# Patient Record
Sex: FEMALE | Race: WHITE | NOT HISPANIC OR LATINO | Employment: FULL TIME | URBAN - METROPOLITAN AREA
[De-identification: names, ages, dates, MRNs, and addresses within clinical notes are randomized per-mention and may not be internally consistent; named-entity substitution may affect disease eponyms.]

---

## 2023-01-16 LAB
ANTICARDIOLIPIN IGA ANTIBODY: <0.5 APL U/ML (ref 0–20)
ANTICARDIOLIPIN IGG ANTIBODY: <1.6 GPL U/ML (ref 0–20)
ANTICARDIOLIPIN IGM ANTIBODY: 0.3 MPL U/ML (ref 0–20)
FOLATE: 16.1 NG/ML
HOMOCYSTEINE: 12.18 UMOL/L (ref 5–13)
TSH SERPL DL<=0.05 MIU/L-ACNC: 3.41 MIU/L (ref 0.44–3.9)
VITAMIN B-12: 169 PG/ML (ref 211–911)
VITAMIN D 25-HYDROXY: 21 NG/ML

## 2023-01-17 LAB
ANA PATTERN: NORMAL
ANA TITER: NORMAL
ANA WITH REFLEX TO ENA: POSITIVE
ANTI RNP AB: 0.2 AI
ANTI SM/ANTIRNP: <0.2 AI
ANTI SM: <0.2 AI
ANTI SSA: <0.2 AI
ANTI SSB: <0.2 AI
ANTI-CENTROMERE: <0.2 AI
ANTI-CHROMATIN ANTIBODY: <0.2 AI
ANTI-JO1 IGG: <0.2 AI
ANTI-SCL70 IGG: <0.2 AI
DOUBLE STRANDED DNA AB, IGG: 1 IU/ML
RIBOSOMAL P AB: <0.2 AI

## 2023-09-30 ENCOUNTER — PHARMACY VISIT (OUTPATIENT)
Dept: PHARMACY | Facility: CLINIC | Age: 58
End: 2023-09-30
Payer: COMMERCIAL

## 2023-10-02 RX ORDER — DIAZEPAM 10 MG/1
TABLET ORAL
Qty: 4 TABLET | Refills: 0 | OUTPATIENT
Start: 2023-10-02 | End: 2024-04-03

## 2023-10-06 ENCOUNTER — PHARMACY VISIT (OUTPATIENT)
Dept: PHARMACY | Facility: CLINIC | Age: 58
End: 2023-10-06
Payer: COMMERCIAL

## 2023-10-06 PROCEDURE — RXMED WILLOW AMBULATORY MEDICATION CHARGE

## 2023-10-12 ENCOUNTER — PHARMACY VISIT (OUTPATIENT)
Dept: PHARMACY | Facility: CLINIC | Age: 58
End: 2023-10-12
Payer: COMMERCIAL

## 2023-10-12 PROCEDURE — RXMED WILLOW AMBULATORY MEDICATION CHARGE

## 2023-10-12 RX ORDER — DIAZEPAM 10 MG/1
10 TABLET ORAL
Qty: 4 TABLET | Refills: 0 | OUTPATIENT
Start: 2023-10-12 | End: 2023-12-28 | Stop reason: WASHOUT

## 2023-10-16 ENCOUNTER — PHARMACY VISIT (OUTPATIENT)
Dept: PHARMACY | Facility: CLINIC | Age: 58
End: 2023-10-16
Payer: COMMERCIAL

## 2023-10-16 PROCEDURE — RXMED WILLOW AMBULATORY MEDICATION CHARGE

## 2023-10-16 RX ORDER — METHYLPREDNISOLONE 4 MG/1
TABLET ORAL
Qty: 21 TABLET | Refills: 0 | OUTPATIENT
Start: 2023-10-16 | End: 2023-12-28 | Stop reason: WASHOUT

## 2023-10-16 RX ORDER — AMOXICILLIN 875 MG/1
875 TABLET, FILM COATED ORAL 2 TIMES DAILY
Qty: 14 TABLET | Refills: 0 | OUTPATIENT
Start: 2023-10-16 | End: 2023-12-28 | Stop reason: WASHOUT

## 2023-10-25 ENCOUNTER — APPOINTMENT (OUTPATIENT)
Dept: CARDIOLOGY | Facility: CLINIC | Age: 58
End: 2023-10-25
Payer: COMMERCIAL

## 2023-12-12 NOTE — PROGRESS NOTES
PCP: Dr. Jones  Cardio: Dr. Zuniga    I was asked by Dr. Zuniga to evaluate this patient in consultation for evaluation of left atrial appendage closure.    The patient is a 58-year-old female employee of [employer] with history of both TIA and CVA as well as migraine headaches.  She has permanent atrial fibrillation and has undergone prior catheter ablation.. The patient has normal LVEF.  Transthoracic echo performed January 17, 2023 demonstrated normal LV systolic function with LVEF 60 to 65%.  The patient has no history of significant valvular heart disease.  The patient is status post permanent pacemaker placement.    Plan functional baseline, patient is quite active and works full-time.  Unfortunately she has had a number of events which have been attributed to TIA events in which she developed right-sided weakness, clumsiness and gait instability.  This has resulted in her falling.  On other occasions she has been able to ease herself down to the ground by sliding down the wall for instance or grabbing onto a handrail.  She has had a presyncopal event prior to her pacemaker placement.    She is currently on aspirin and Plavix.  She is not on oral anticoagulant therapy.    Given the patient's significant fall risk,  the patient is referred for consideration of left atrial appendage closure for stroke risk reduction.       ROS:  Constitutional: not feeling tired, not feeling poorly, no fever and no chills.   Eyes: no eyesight problems, no blurred vision, no diplopia, no eye pain, no purulent discharge from the eyes, eyes not red, no dryness of the eyes and no itching of the eyes.   ENT: no nosebleeds, no hearing loss, no tinnitus, no earache, no sore throat, no hoarseness, no swollen glands in the neck and no nasal discharge.   Cardiovascular: no intermittent leg claudication, no chest pain, no tightness or heavy pressure, no shortness of breath, no palpitations, no lower extremity edema, the heart rate  was not slow, the heart rate was not fast and as noted in HPI.   Respiratory: no chronic cough, not coughing up sputum,  no wheezing that is consistent with asthma, no asthma, no orthopnea and no postural nocturnal dyspnea.   Gastrointestinal: no change in bowel habits, no blood in stools, no diarrhea, no constipation, no nausea, no vomiting, no abdominal pain, no signs and symptoms of ulcer disease, no gilbert colored stools and no intolerance to fatty foods.   Genitourinary: no hematuria,  no urinary frequency, no dysuria, no incontinence, no burning sensation during urination, no urinary hesitancy, no nocturia, no genital lesion, no testicular pain, urinary stream is not smaller and urinary stream does not start and stop.   Musculoskeletal: no arthralgias, no myalgias, no joint swelling, no joint stiffness, no muscle weakness, no back pain, no limb pain, no limb swelling and no difficulty walking.   Skin: no skin rashes, no change in skin color and pigmentation, no skin lesions and no skin lumps.   Neurological: no seizures, notable for frequent falls, no headaches, no dizziness, no tingling, also has history of fainting and no limb weakness.   Psychiatric: no depression, not suicidal, no confusion, no memory lapses or loss, no anxiety, no personality change and no emotional problems.   Endocrine: no goiter, no thyroid disorder, no diabetes mellitus, no excessive thirst, no dry skin, no cold intolerance, no heat intolerance, no erectile dysfunction, no increased urinary frequency, no proptosis and no deepening of the voice.   Hematologic/Lymphatic: no bleeding issues.   All other systems have been reviewed and are negative for complaint.     Physical Exam:     There were no vitals taken for this visit.     Constitutional: alert and in no acute distress.   Eyes: no erythema, swelling or discharge from the eye .   Ears, Nose, Mouth, and Throat: external inspection of ears and nose is normal , lips, teeth, and gums are  normal with good dentition  and oropharynx normal with no erythema, edema, exudate or lesions .   Neck: neck is supple, symmetric, trachea midline, no masses  and no thyromegaly .   Pulmonary: no increased work of breathing or signs of respiratory distress , lungs clear to auscultation. , normal percussion of chest  and chest palpation normal .   Cardiovascular: RRR, no murmur,  no leg edema  Abdomen: abdomen non-tender, no masses  and no hepatomegaly .           Skin:  no skin lesions          Neurologic: non-focal neurologic examination.      Psychiatric judgment and insight is normal , oriented to person, place and time , normal mood and affect .       Labs:    Results for orders placed or performed in visit on 09/14/22   Thyroid Stimulating Hormone   Result Value Ref Range    TSH 2.05 0.44 - 3.98 mIU/L   Vitamin D, Total   Result Value Ref Range    Vitamin D, 25-Hydroxy 11 (A) ng/mL   Lipid Panel   Result Value Ref Range    Cholesterol 193 0 - 199 mg/dL    HDL 56.0 mg/dL    Cholesterol/HDL Ratio 3.4      (H) 0 - 99 mg/dL    VLDL 21 0 - 40 mg/dL    Triglycerides 103 0 - 149 mg/dL   CBC   Result Value Ref Range    WBC 5.5 4.4 - 11.3 x10E9/L    RBC 4.69 4.00 - 5.20 x10E12/L    Hemoglobin 13.8 12.0 - 16.0 g/dL    Hematocrit 44.2 36.0 - 46.0 %    MCV 94 80 - 100 fL    MCHC 31.2 (L) 32.0 - 36.0 g/dL    Platelets 247 150 - 450 x10E9/L    RDW 12.4 11.5 - 14.5 %   Comprehensive Metabolic Panel   Result Value Ref Range    Glucose 86 74 - 99 mg/dL    Sodium 141 136 - 145 mmol/L    Potassium 4.4 3.5 - 5.3 mmol/L    Chloride 107 98 - 107 mmol/L    Bicarbonate 28 21 - 32 mmol/L    Anion Gap 10 10 - 20 mmol/L    Urea Nitrogen 14 6 - 23 mg/dL    Creatinine 0.88 0.50 - 1.05 mg/dL    GFR Female 75 >90 mL/min/1.73m2    Calcium 9.2 8.6 - 10.3 mg/dL    Albumin 4.1 3.4 - 5.0 g/dL    Alkaline Phosphatase 80 33 - 110 U/L    Total Protein 6.6 6.4 - 8.2 g/dL    AST 17 9 - 39 U/L    Total Bilirubin 0.6 0.0 - 1.2 mg/dL    ALT (SGPT)  16 7 - 45 U/L          Medications:    Current Outpatient Medications   Medication Instructions    amoxicillin (AMOXIL) 875 mg, oral, 2 times daily    diazePAM (Valium) 10 mg tablet TAKE 1 TABLET BY MOUTH 1/2 HOUR PRIOR TO APPOINTMENT.    diazePAM (Valium) 10 mg tablet Take 1 tablet (10 mg) by mouth 30 minutes prior to appointment.    methylPREDNISolone (Medrol Dospak) 4 mg tablets Take by mouth as directed on package    metoprolol tartrate (Lopressor) 25 mg tablet TAKE 1 TABLET BY MOUTH TWO TIMES A DAY    oxybutynin XL (Ditropan-XL) 15 mg 24 hr tablet TAKE 2 TABLETS BY MOUTH ONCE DAILY    simvastatin (Zocor) 20 mg tablet TAKE 1 TABLET BY MOUTH EVERY NIGHT AT BEDTIME          Assessment:      This is a 4-year-old female with permanent atrial fibrillation and significant risk of falls.    The CHADS-VASC score is 3 and HAS-BLED score is 2. The patient is at increased risk of both bleeding and stroke.  As such the patient is a reasonable candidate for consideration of left atrial appendage occluder placement.    Today we discussed the left atrial appendage closure procedure. The patient was given written educational handout materials and watched an educational video. All risks, benefits and alternative were discussed.     The risks discussed included but were not limited to vascular complications, sedation related complications, risk of MI, CVA, device embolization, pericardial tamponade and death. The patient verbalized understanding and decided to proceed.        Plan will be for preprocedural cardiac CT. Following device implant, strategy will be for dual antiplatelet therapy with aspirin and clopidogrel for 6 months then aspirin for life.     Thank you, Dr. Zuniga, for this consultation and for allowing me to participate in the care of this patient.

## 2023-12-13 ENCOUNTER — OFFICE VISIT (OUTPATIENT)
Dept: CARDIOLOGY | Facility: CLINIC | Age: 58
End: 2023-12-13
Payer: COMMERCIAL

## 2023-12-13 VITALS
TEMPERATURE: 96.9 F | OXYGEN SATURATION: 98 % | SYSTOLIC BLOOD PRESSURE: 136 MMHG | BODY MASS INDEX: 30.06 KG/M2 | RESPIRATION RATE: 14 BRPM | HEART RATE: 80 BPM | DIASTOLIC BLOOD PRESSURE: 72 MMHG | HEIGHT: 70 IN | WEIGHT: 210 LBS

## 2023-12-13 DIAGNOSIS — I48.91 ATRIAL FIBRILLATION, UNSPECIFIED TYPE (MULTI): ICD-10-CM

## 2023-12-13 DIAGNOSIS — I48.21 PERMANENT ATRIAL FIBRILLATION (MULTI): Primary | ICD-10-CM

## 2023-12-13 PROCEDURE — 1036F TOBACCO NON-USER: CPT | Performed by: INTERNAL MEDICINE

## 2023-12-13 PROCEDURE — 99214 OFFICE O/P EST MOD 30 MIN: CPT | Performed by: INTERNAL MEDICINE

## 2023-12-13 PROCEDURE — 99204 OFFICE O/P NEW MOD 45 MIN: CPT | Performed by: INTERNAL MEDICINE

## 2023-12-13 RX ORDER — OMEPRAZOLE 20 MG/1
20 CAPSULE, DELAYED RELEASE ORAL DAILY
COMMUNITY
End: 2024-06-03 | Stop reason: DRUGHIGH

## 2023-12-13 RX ORDER — CLOPIDOGREL BISULFATE 75 MG/1
75 TABLET ORAL DAILY
Status: ON HOLD | COMMUNITY
Start: 2023-02-11 | End: 2024-03-01 | Stop reason: WASHOUT

## 2023-12-13 RX ORDER — NAPROXEN SODIUM 220 MG/1
1 TABLET, FILM COATED ORAL DAILY
Status: ON HOLD | COMMUNITY
Start: 2023-01-17 | End: 2024-03-01 | Stop reason: WASHOUT

## 2023-12-13 RX ORDER — SERTRALINE HYDROCHLORIDE 100 MG/1
100 TABLET, FILM COATED ORAL DAILY
COMMUNITY

## 2023-12-13 RX ORDER — AMITRIPTYLINE HYDROCHLORIDE 10 MG/1
10 TABLET, FILM COATED ORAL NIGHTLY PRN
COMMUNITY
Start: 2019-12-26

## 2023-12-15 PROCEDURE — RXMED WILLOW AMBULATORY MEDICATION CHARGE

## 2023-12-18 ENCOUNTER — TELEPHONE (OUTPATIENT)
Dept: CARDIOLOGY | Facility: HOSPITAL | Age: 58
End: 2023-12-18

## 2023-12-18 ENCOUNTER — TELEPHONE (OUTPATIENT)
Dept: CARDIOLOGY | Facility: HOSPITAL | Age: 58
End: 2023-12-18
Payer: COMMERCIAL

## 2023-12-20 ENCOUNTER — PHARMACY VISIT (OUTPATIENT)
Dept: PHARMACY | Facility: CLINIC | Age: 58
End: 2023-12-20
Payer: COMMERCIAL

## 2023-12-22 ENCOUNTER — APPOINTMENT (OUTPATIENT)
Dept: RADIOLOGY | Facility: HOSPITAL | Age: 58
End: 2023-12-22
Payer: COMMERCIAL

## 2023-12-27 ENCOUNTER — APPOINTMENT (OUTPATIENT)
Dept: CARDIOLOGY | Facility: CLINIC | Age: 58
End: 2023-12-27
Payer: COMMERCIAL

## 2023-12-28 PROBLEM — F41.9 ANXIETY: Status: ACTIVE | Noted: 2023-12-28

## 2023-12-28 PROBLEM — G90.9 AUTONOMIC DYSFUNCTION: Status: ACTIVE | Noted: 2023-12-28

## 2023-12-28 PROBLEM — G25.81 RLS (RESTLESS LEGS SYNDROME): Status: ACTIVE | Noted: 2023-12-28

## 2023-12-28 PROBLEM — Z95.0 CARDIAC PACEMAKER IN SITU: Status: ACTIVE | Noted: 2023-12-28

## 2023-12-28 PROBLEM — E78.2 MIXED DYSLIPIDEMIA: Status: ACTIVE | Noted: 2023-12-28

## 2023-12-28 PROBLEM — G45.9 TIA (TRANSIENT ISCHEMIC ATTACK): Status: ACTIVE | Noted: 2023-12-28

## 2023-12-28 RX ORDER — METOPROLOL SUCCINATE 25 MG/1
1 TABLET, EXTENDED RELEASE ORAL DAILY
Status: ON HOLD | COMMUNITY
End: 2024-03-01 | Stop reason: WASHOUT

## 2023-12-28 RX ORDER — CYCLOBENZAPRINE HCL 5 MG
5 TABLET ORAL DAILY PRN
COMMUNITY

## 2023-12-28 RX ORDER — PANTOPRAZOLE SODIUM 40 MG/1
40 TABLET, DELAYED RELEASE ORAL
Status: ON HOLD | COMMUNITY
Start: 2023-04-17 | End: 2024-03-01 | Stop reason: WASHOUT

## 2023-12-28 RX ORDER — SUCRALFATE 1 G/1
1 TABLET ORAL
Status: ON HOLD | COMMUNITY
Start: 2023-02-06 | End: 2024-03-01 | Stop reason: WASHOUT

## 2023-12-28 RX ORDER — ROPINIROLE 0.25 MG/1
0.25 TABLET, FILM COATED ORAL NIGHTLY PRN
COMMUNITY
Start: 2022-08-22

## 2023-12-28 RX ORDER — METHENAMINE, SODIUM PHOSPHATE, MONOBASIC, MONOHYDRATE, PHENYL SALICYLATE, METHYLENE BLUE, AND HYOSCYAMINE SULFATE 118; 40.8; 36; 10; .12 MG/1; MG/1; MG/1; MG/1; MG/1
1 CAPSULE ORAL EVERY 8 HOURS PRN
COMMUNITY
Start: 2019-02-14

## 2024-01-05 ENCOUNTER — LAB (OUTPATIENT)
Dept: LAB | Facility: LAB | Age: 59
End: 2024-01-05
Payer: COMMERCIAL

## 2024-01-05 DIAGNOSIS — I48.91 ATRIAL FIBRILLATION, UNSPECIFIED TYPE (MULTI): ICD-10-CM

## 2024-01-05 LAB
ANION GAP SERPL CALC-SCNC: 10 MMOL/L (ref 10–20)
BUN SERPL-MCNC: 17 MG/DL (ref 6–23)
CALCIUM SERPL-MCNC: 8.8 MG/DL (ref 8.6–10.3)
CHLORIDE SERPL-SCNC: 105 MMOL/L (ref 98–107)
CO2 SERPL-SCNC: 28 MMOL/L (ref 21–32)
CREAT SERPL-MCNC: 0.76 MG/DL (ref 0.5–1.05)
ERYTHROCYTE [DISTWIDTH] IN BLOOD BY AUTOMATED COUNT: 12.7 % (ref 11.5–14.5)
GFR SERPL CREATININE-BSD FRML MDRD: 90 ML/MIN/1.73M*2
GLUCOSE SERPL-MCNC: 98 MG/DL (ref 74–99)
HCT VFR BLD AUTO: 40.5 % (ref 36–46)
HGB BLD-MCNC: 13.2 G/DL (ref 12–16)
MCH RBC QN AUTO: 30.1 PG (ref 26–34)
MCHC RBC AUTO-ENTMCNC: 32.6 G/DL (ref 32–36)
MCV RBC AUTO: 92 FL (ref 80–100)
NRBC BLD-RTO: 0 /100 WBCS (ref 0–0)
PLATELET # BLD AUTO: 252 X10*3/UL (ref 150–450)
POTASSIUM SERPL-SCNC: 4 MMOL/L (ref 3.5–5.3)
RBC # BLD AUTO: 4.39 X10*6/UL (ref 4–5.2)
SODIUM SERPL-SCNC: 139 MMOL/L (ref 136–145)
WBC # BLD AUTO: 5.6 X10*3/UL (ref 4.4–11.3)

## 2024-01-05 PROCEDURE — 36415 COLL VENOUS BLD VENIPUNCTURE: CPT

## 2024-01-05 PROCEDURE — 85027 COMPLETE CBC AUTOMATED: CPT

## 2024-01-05 PROCEDURE — 80048 BASIC METABOLIC PNL TOTAL CA: CPT

## 2024-01-08 ENCOUNTER — HOSPITAL ENCOUNTER (OUTPATIENT)
Dept: CARDIOLOGY | Facility: HOSPITAL | Age: 59
Discharge: HOME | End: 2024-01-08
Payer: COMMERCIAL

## 2024-01-08 ENCOUNTER — APPOINTMENT (OUTPATIENT)
Dept: CARDIOLOGY | Facility: CLINIC | Age: 59
End: 2024-01-08
Payer: COMMERCIAL

## 2024-01-08 DIAGNOSIS — Z95.0 CARDIAC PACEMAKER IN SITU: Primary | ICD-10-CM

## 2024-01-08 DIAGNOSIS — I49.5 SINOATRIAL NODE DYSFUNCTION (MULTI): ICD-10-CM

## 2024-01-08 DIAGNOSIS — Z95.0 CARDIAC PACEMAKER IN SITU: ICD-10-CM

## 2024-01-08 PROCEDURE — 93294 REM INTERROG EVL PM/LDLS PM: CPT | Performed by: INTERNAL MEDICINE

## 2024-01-08 PROCEDURE — 93296 REM INTERROG EVL PM/IDS: CPT

## 2024-01-26 ENCOUNTER — TELEPHONE (OUTPATIENT)
Dept: CARDIOLOGY | Facility: HOSPITAL | Age: 59
End: 2024-01-26
Payer: COMMERCIAL

## 2024-01-26 DIAGNOSIS — I48.91 ATRIAL FIBRILLATION, UNSPECIFIED TYPE (MULTI): ICD-10-CM

## 2024-02-02 ENCOUNTER — HOSPITAL ENCOUNTER (OUTPATIENT)
Dept: RADIOLOGY | Facility: HOSPITAL | Age: 59
End: 2024-02-02
Payer: COMMERCIAL

## 2024-02-19 DIAGNOSIS — N32.81 OAB (OVERACTIVE BLADDER): Primary | ICD-10-CM

## 2024-02-19 DIAGNOSIS — E78.2 MIXED HYPERLIPIDEMIA: Primary | ICD-10-CM

## 2024-02-19 PROCEDURE — RXMED WILLOW AMBULATORY MEDICATION CHARGE

## 2024-02-19 RX ORDER — OXYBUTYNIN CHLORIDE 15 MG/1
30 TABLET, EXTENDED RELEASE ORAL DAILY
Qty: 180 TABLET | Refills: 0 | Status: SHIPPED | OUTPATIENT
Start: 2024-02-19 | End: 2024-05-23

## 2024-02-20 RX ORDER — SIMVASTATIN 20 MG/1
20 TABLET, FILM COATED ORAL NIGHTLY
Qty: 90 TABLET | Refills: 3 | Status: SHIPPED | OUTPATIENT
Start: 2024-02-20 | End: 2024-03-15 | Stop reason: SDUPTHER

## 2024-02-21 PROCEDURE — RXMED WILLOW AMBULATORY MEDICATION CHARGE

## 2024-02-23 ENCOUNTER — APPOINTMENT (OUTPATIENT)
Dept: CARDIOLOGY | Facility: CLINIC | Age: 59
End: 2024-02-23
Payer: COMMERCIAL

## 2024-02-23 ENCOUNTER — PHARMACY VISIT (OUTPATIENT)
Dept: PHARMACY | Facility: CLINIC | Age: 59
End: 2024-02-23
Payer: COMMERCIAL

## 2024-02-26 ENCOUNTER — APPOINTMENT (OUTPATIENT)
Dept: CARDIOLOGY | Facility: CLINIC | Age: 59
End: 2024-02-26
Payer: COMMERCIAL

## 2024-02-27 ENCOUNTER — LAB (OUTPATIENT)
Dept: LAB | Facility: LAB | Age: 59
End: 2024-02-27
Payer: COMMERCIAL

## 2024-02-27 DIAGNOSIS — I48.91 ATRIAL FIBRILLATION, UNSPECIFIED TYPE (MULTI): ICD-10-CM

## 2024-02-27 LAB
ANION GAP SERPL CALC-SCNC: 17 MMOL/L (ref 10–20)
BUN SERPL-MCNC: 14 MG/DL (ref 6–23)
CALCIUM SERPL-MCNC: 9.5 MG/DL (ref 8.6–10.3)
CHLORIDE SERPL-SCNC: 105 MMOL/L (ref 98–107)
CO2 SERPL-SCNC: 23 MMOL/L (ref 21–32)
CREAT SERPL-MCNC: 0.85 MG/DL (ref 0.5–1.05)
EGFRCR SERPLBLD CKD-EPI 2021: 79 ML/MIN/1.73M*2
ERYTHROCYTE [DISTWIDTH] IN BLOOD BY AUTOMATED COUNT: 12.4 % (ref 11.5–14.5)
GLUCOSE SERPL-MCNC: 113 MG/DL (ref 74–99)
HCT VFR BLD AUTO: 43.1 % (ref 36–46)
HGB BLD-MCNC: 13.9 G/DL (ref 12–16)
MCH RBC QN AUTO: 29.9 PG (ref 26–34)
MCHC RBC AUTO-ENTMCNC: 32.3 G/DL (ref 32–36)
MCV RBC AUTO: 93 FL (ref 80–100)
NRBC BLD-RTO: 0 /100 WBCS (ref 0–0)
PLATELET # BLD AUTO: 236 X10*3/UL (ref 150–450)
POTASSIUM SERPL-SCNC: 4.2 MMOL/L (ref 3.5–5.3)
RBC # BLD AUTO: 4.65 X10*6/UL (ref 4–5.2)
SODIUM SERPL-SCNC: 141 MMOL/L (ref 136–145)
WBC # BLD AUTO: 6.5 X10*3/UL (ref 4.4–11.3)

## 2024-02-27 PROCEDURE — 80048 BASIC METABOLIC PNL TOTAL CA: CPT

## 2024-02-27 PROCEDURE — 85027 COMPLETE CBC AUTOMATED: CPT

## 2024-02-27 PROCEDURE — 36415 COLL VENOUS BLD VENIPUNCTURE: CPT

## 2024-02-29 PROBLEM — I48.91 ATRIAL FIBRILLATION, UNSPECIFIED TYPE (MULTI): Status: ACTIVE | Noted: 2024-02-29

## 2024-03-01 ENCOUNTER — HOSPITAL ENCOUNTER (OUTPATIENT)
Dept: RADIOLOGY | Facility: HOSPITAL | Age: 59
Discharge: HOME | DRG: 310 | End: 2024-03-01
Payer: COMMERCIAL

## 2024-03-01 ENCOUNTER — HOSPITAL ENCOUNTER (OUTPATIENT)
Facility: HOSPITAL | Age: 59
Discharge: HOME | DRG: 310 | End: 2024-03-01
Attending: INTERNAL MEDICINE | Admitting: INTERNAL MEDICINE
Payer: COMMERCIAL

## 2024-03-01 ENCOUNTER — HOSPITAL ENCOUNTER (OUTPATIENT)
Dept: CARDIOLOGY | Facility: HOSPITAL | Age: 59
Discharge: HOME | DRG: 310 | End: 2024-03-01
Payer: COMMERCIAL

## 2024-03-01 VITALS
RESPIRATION RATE: 20 BRPM | DIASTOLIC BLOOD PRESSURE: 78 MMHG | OXYGEN SATURATION: 100 % | HEART RATE: 72 BPM | SYSTOLIC BLOOD PRESSURE: 119 MMHG

## 2024-03-01 DIAGNOSIS — I48.91 ATRIAL FIBRILLATION, UNSPECIFIED TYPE (MULTI): ICD-10-CM

## 2024-03-01 DIAGNOSIS — I48.91 ATRIAL FIBRILLATION, UNSPECIFIED TYPE (MULTI): Primary | ICD-10-CM

## 2024-03-01 DIAGNOSIS — Z95.818 PRESENCE OF WATCHMAN LEFT ATRIAL APPENDAGE CLOSURE DEVICE: ICD-10-CM

## 2024-03-01 PROCEDURE — 7100000010 HC PHASE TWO TIME - EACH INCREMENTAL 1 MINUTE: Performed by: INTERNAL MEDICINE

## 2024-03-01 PROCEDURE — 93005 ELECTROCARDIOGRAM TRACING: CPT | Mod: 59

## 2024-03-01 PROCEDURE — 2780000003 HC OR 278 NO HCPCS: Performed by: INTERNAL MEDICINE

## 2024-03-01 PROCEDURE — 1200000002 HC GENERAL ROOM WITH TELEMETRY DAILY

## 2024-03-01 PROCEDURE — 75572 CT HRT W/3D IMAGE: CPT | Performed by: RADIOLOGY

## 2024-03-01 PROCEDURE — 33340 PERQ CLSR TCAT L ATR APNDGE: CPT | Mod: 73 | Performed by: INTERNAL MEDICINE

## 2024-03-01 PROCEDURE — C1893 INTRO/SHEATH, FIXED,NON-PEEL: HCPCS | Performed by: INTERNAL MEDICINE

## 2024-03-01 PROCEDURE — 7100000009 HC PHASE TWO TIME - INITIAL BASE CHARGE: Performed by: INTERNAL MEDICINE

## 2024-03-01 PROCEDURE — C1889 IMPLANT/INSERT DEVICE, NOC: HCPCS | Performed by: INTERNAL MEDICINE

## 2024-03-01 PROCEDURE — C1894 INTRO/SHEATH, NON-LASER: HCPCS | Performed by: INTERNAL MEDICINE

## 2024-03-01 PROCEDURE — C1759 CATH, INTRA ECHOCARDIOGRAPHY: HCPCS | Performed by: INTERNAL MEDICINE

## 2024-03-01 PROCEDURE — 2550000001 HC RX 255 CONTRASTS: Performed by: INTERNAL MEDICINE

## 2024-03-01 PROCEDURE — C1760 CLOSURE DEV, VASC: HCPCS | Performed by: INTERNAL MEDICINE

## 2024-03-01 PROCEDURE — 2500000004 HC RX 250 GENERAL PHARMACY W/ HCPCS (ALT 636 FOR OP/ED): Performed by: INTERNAL MEDICINE

## 2024-03-01 PROCEDURE — 93010 ELECTROCARDIOGRAM REPORT: CPT | Performed by: INTERNAL MEDICINE

## 2024-03-01 PROCEDURE — 75572 CT HRT W/3D IMAGE: CPT

## 2024-03-01 PROCEDURE — 2500000001 HC RX 250 WO HCPCS SELF ADMINISTERED DRUGS (ALT 637 FOR MEDICARE OP): Performed by: NURSE PRACTITIONER

## 2024-03-01 PROCEDURE — 2720000007 HC OR 272 NO HCPCS: Performed by: INTERNAL MEDICINE

## 2024-03-01 RX ORDER — SUCRALFATE 1 G/1
1 TABLET ORAL DAILY PRN
COMMUNITY

## 2024-03-01 RX ORDER — PANTOPRAZOLE SODIUM 40 MG/1
40 TABLET, DELAYED RELEASE ORAL
Status: DISCONTINUED | OUTPATIENT
Start: 2024-03-02 | End: 2024-03-01 | Stop reason: HOSPADM

## 2024-03-01 RX ORDER — CLOPIDOGREL BISULFATE 75 MG/1
75 TABLET ORAL DAILY
Status: DISCONTINUED | OUTPATIENT
Start: 2024-03-01 | End: 2024-03-01 | Stop reason: HOSPADM

## 2024-03-01 RX ORDER — ACETAMINOPHEN 160 MG/5ML
650 SOLUTION ORAL EVERY 6 HOURS PRN
Status: DISCONTINUED | OUTPATIENT
Start: 2024-03-01 | End: 2024-03-01 | Stop reason: HOSPADM

## 2024-03-01 RX ORDER — TRAMADOL HYDROCHLORIDE 50 MG/1
50 TABLET ORAL EVERY 6 HOURS PRN
Status: DISCONTINUED | OUTPATIENT
Start: 2024-03-01 | End: 2024-03-01 | Stop reason: HOSPADM

## 2024-03-01 RX ORDER — NAPROXEN SODIUM 220 MG/1
324 TABLET, FILM COATED ORAL ONCE
Status: COMPLETED | OUTPATIENT
Start: 2024-03-01 | End: 2024-03-01

## 2024-03-01 RX ORDER — NAPROXEN SODIUM 220 MG/1
81 TABLET, FILM COATED ORAL DAILY
Status: DISCONTINUED | OUTPATIENT
Start: 2024-03-01 | End: 2024-03-01 | Stop reason: HOSPADM

## 2024-03-01 RX ORDER — CLOPIDOGREL BISULFATE 300 MG/1
600 TABLET, FILM COATED ORAL ONCE
Status: DISCONTINUED | OUTPATIENT
Start: 2024-03-01 | End: 2024-03-01 | Stop reason: HOSPADM

## 2024-03-01 RX ORDER — DOCUSATE SODIUM 100 MG/1
100 CAPSULE, LIQUID FILLED ORAL 2 TIMES DAILY
Status: DISCONTINUED | OUTPATIENT
Start: 2024-03-01 | End: 2024-03-01 | Stop reason: HOSPADM

## 2024-03-01 RX ORDER — SODIUM CHLORIDE, SODIUM LACTATE, POTASSIUM CHLORIDE, CALCIUM CHLORIDE 600; 310; 30; 20 MG/100ML; MG/100ML; MG/100ML; MG/100ML
75 INJECTION, SOLUTION INTRAVENOUS CONTINUOUS
Status: DISCONTINUED | OUTPATIENT
Start: 2024-03-01 | End: 2024-03-01 | Stop reason: HOSPADM

## 2024-03-01 RX ORDER — CLOPIDOGREL BISULFATE 75 MG/1
75 TABLET ORAL DAILY
Status: ON HOLD | COMMUNITY
End: 2024-03-08 | Stop reason: WASHOUT

## 2024-03-01 RX ORDER — ACETAMINOPHEN 650 MG/1
650 SUPPOSITORY RECTAL EVERY 6 HOURS PRN
Status: DISCONTINUED | OUTPATIENT
Start: 2024-03-01 | End: 2024-03-01 | Stop reason: HOSPADM

## 2024-03-01 RX ORDER — ASPIRIN 81 MG/1
81 TABLET ORAL DAILY
COMMUNITY

## 2024-03-01 RX ORDER — CEFAZOLIN SODIUM 2 G/100ML
2 INJECTION, SOLUTION INTRAVENOUS ONCE
Status: DISCONTINUED | OUTPATIENT
Start: 2024-03-01 | End: 2024-03-01 | Stop reason: HOSPADM

## 2024-03-01 RX ORDER — CEFAZOLIN SODIUM 2 G/50ML
SOLUTION INTRAVENOUS CONTINUOUS PRN
Status: COMPLETED | OUTPATIENT
Start: 2024-03-01 | End: 2024-03-01

## 2024-03-01 RX ORDER — ACETAMINOPHEN 325 MG/1
650 TABLET ORAL EVERY 6 HOURS PRN
Status: DISCONTINUED | OUTPATIENT
Start: 2024-03-01 | End: 2024-03-01 | Stop reason: HOSPADM

## 2024-03-01 RX ORDER — PANTOPRAZOLE SODIUM 40 MG/10ML
40 INJECTION, POWDER, LYOPHILIZED, FOR SOLUTION INTRAVENOUS
Status: DISCONTINUED | OUTPATIENT
Start: 2024-03-02 | End: 2024-03-01 | Stop reason: HOSPADM

## 2024-03-01 RX ADMIN — IOHEXOL 70 ML: 350 INJECTION, SOLUTION INTRAVENOUS at 08:25

## 2024-03-01 RX ADMIN — ASPIRIN 81 MG CHEWABLE TABLET 324 MG: 81 TABLET CHEWABLE at 09:09

## 2024-03-01 NOTE — PROGRESS NOTES
"Pharmacy Medication History Review    Yarelis Brantley \"Thu\" is a 58 y.o. female admitted for Atrial fibrillation (CMS/[facility]). Pharmacy reviewed the patient's sdapc-yd-qxeapkyop medications and allergies for accuracy.    The list below reflects the updated PTA list. Comments regarding how patient may be taking medications differently can be found in the Admit Orders Activity  Prior to Admission Medications   Prescriptions Last Dose Informant Patient Reported?   amitriptyline (Elavil) 10 mg tablet 2/27/2024 Self Yes   Sig: Take 1 tablet (10 mg) by mouth as needed at bedtime for sleep.   aspirin 81 mg EC tablet 2/29/2024 Self Yes   Sig: Take 1 tablet (81 mg) by mouth once daily.   clopidogrel (Plavix) 75 mg tablet Past Week Self Yes   Sig: Take 1 tablet (75 mg) by mouth once daily.   cyclobenzaprine (Flexeril) 5 mg tablet More than a month Self Yes   Sig: Take 1 tablet (5 mg) by mouth once daily as needed.   diazePAM (Valium) 10 mg tablet   No   Sig: TAKE 1 TABLET BY MOUTH 1/2 HOUR PRIOR TO APPOINTMENT.   melatonin 10 mg tablet,ext release multiphase 2/29/2024 Self Yes   Sig: Take 1 tablet by mouth once daily at bedtime.   methen-m.blue-s.phos-phsal-hyo (UribeL) 118-10-40.8-36 mg capsule Past Month Self Yes   Sig: Take 1 capsule by mouth every 8 hours.   metoprolol tartrate (Lopressor) 25 mg tablet 2/29/2024 Self No   Sig: TAKE 1 TABLET BY MOUTH TWO TIMES A DAY   omeprazole (PriLOSEC) 20 mg DR capsule 2/29/2024 Self Yes   Sig: Take 1 capsule (20 mg) by mouth once daily.   oxybutynin XL (Ditropan-XL) 15 mg 24 hr tablet 2/29/2024 Self No   Sig: TAKE 2 TABLETS BY MOUTH ONCE DAILY   rOPINIRole (Requip) 0.25 mg tablet 2/29/2024 Self Yes   Sig: Take 1 tablet (0.25 mg) by mouth once daily at bedtime.   sertraline (Zoloft) 100 mg tablet 2/29/2024 Self Yes   Sig: Take 1 tablet (100 mg) by mouth once daily.   simvastatin (Zocor) 20 mg tablet 2/29/2024 Self No   Sig: TAKE 1 TABLET BY MOUTH EVERY NIGHT AT BEDTIME   sucralfate " (Carafate) 1 gram tablet 2/29/2024 Self Yes   Sig: Take 1 tablet (1 g) by mouth once daily.      Facility-Administered Medications: None        The list below reflects the updated allergy list. Please review each documented allergy for additional clarification and justification.  Allergies  Reviewed by Librado Franco PharmD on 3/1/2024        Severity Reactions Comments    Prochlorperazine Low Other, Rash, Nausea/vomiting vomiting            Patient declines M2B at discharge. Pharmacy has been updated to [facility] pharmacy poon.    Sources used to complete the med history include out patient fill history, OARRS, and patient interview.      Below are additional concerns with the patient's PTA list.  -IRASEMA Franco PharmD  Transitions of Care Pharmacist  [facility]  Please reach out via Secure Chat for questions, or if no response call [facility] or [facility]

## 2024-03-01 NOTE — H&P
PCP: Dr. Jones  Cardio: Dr. Zuniga       The patient is a 58-year-old female employee of [employer] with history of both TIA and CVA as well as migraine headaches.  She has permanent atrial fibrillation and has undergone prior catheter ablation.. The patient has normal LVEF.  Transthoracic echo performed January 17, 2023 demonstrated normal LV systolic function with LVEF 60 to 65%.  The patient has no history of significant valvular heart disease.  The patient is status post permanent pacemaker placement.     Plan functional baseline, patient is quite active and works full-time.  Unfortunately she has had a number of events which have been attributed to TIA events in which she developed right-sided weakness, clumsiness and gait instability.  This has resulted in her falling.  On other occasions she has been able to ease herself down to the ground by sliding down the wall for instance or grabbing onto a handrail.  She has had a presyncopal event prior to her pacemaker placement.     She is currently on aspirin and Plavix.  She is not on oral anticoagulant therapy.     Given the patient's significant fall risk,  the patient is here for LAAC       ROS:  Constitutional: not feeling tired, not feeling poorly, no fever and no chills.   Eyes: no eyesight problems, no blurred vision, no diplopia, no eye pain, no purulent discharge from the eyes, eyes not red, no dryness of the eyes and no itching of the eyes.   ENT: no nosebleeds, no hearing loss, no tinnitus, no earache, no sore throat, no hoarseness, no swollen glands in the neck and no nasal discharge.   Cardiovascular: no intermittent leg claudication, no chest pain, no tightness or heavy pressure, no shortness of breath, no palpitations, no lower extremity edema, the heart rate was not slow, the heart rate was not fast and as noted in HPI.   Respiratory: no chronic cough, not coughing up sputum,  no wheezing that is consistent with asthma, no asthma, no  orthopnea and no postural nocturnal dyspnea.   Gastrointestinal: no change in bowel habits, no blood in stools, no diarrhea, no constipation, no nausea, no vomiting, no abdominal pain, no signs and symptoms of ulcer disease, no gilebrt colored stools and no intolerance to fatty foods.   Genitourinary: no hematuria,  no urinary frequency, no dysuria, no incontinence, no burning sensation during urination, no urinary hesitancy, no nocturia, no genital lesion, no testicular pain, urinary stream is not smaller and urinary stream does not start and stop.   Musculoskeletal: no arthralgias, no myalgias, no joint swelling, no joint stiffness, no muscle weakness, no back pain, no limb pain, no limb swelling and no difficulty walking.   Skin: no skin rashes, no change in skin color and pigmentation, no skin lesions and no skin lumps.   Neurological: no seizures, notable for frequent falls, no headaches, no dizziness, no tingling, also has history of fainting and no limb weakness.   Psychiatric: no depression, not suicidal, no confusion, no memory lapses or loss, no anxiety, no personality change and no emotional problems.   Endocrine: no goiter, no thyroid disorder, no diabetes mellitus, no excessive thirst, no dry skin, no cold intolerance, no heat intolerance, no erectile dysfunction, no increased urinary frequency, no proptosis and no deepening of the voice.   Hematologic/Lymphatic: no bleeding issues.   All other systems have been reviewed and are negative for complaint.      Physical Exam:      vs; reviewed     Constitutional: alert and in no acute distress.   Eyes: no erythema, swelling or discharge from the eye .   Ears, Nose, Mouth, and Throat: external inspection of ears and nose is normal , lips, teeth, and gums are normal with good dentition  and oropharynx normal with no erythema, edema, exudate or lesions .   Neck: neck is supple, symmetric, trachea midline, no masses  and no thyromegaly .   Pulmonary: no increased  work of breathing or signs of respiratory distress , lungs clear to auscultation. , normal percussion of chest  and chest palpation normal .   Cardiovascular: RRR, no murmur,  no leg edema  Abdomen: abdomen non-tender, no masses  and no hepatomegaly .           Skin:  no skin lesions          Neurologic: non-focal neurologic examination.      Psychiatric judgment and insight is normal , oriented to person, place and time , normal mood and affect .         Labs:           Results for orders placed or performed in visit on 09/14/22   Thyroid Stimulating Hormone   Result Value Ref Range     TSH 2.05 0.44 - 3.98 mIU/L   Vitamin D, Total   Result Value Ref Range     Vitamin D, 25-Hydroxy 11 (A) ng/mL   Lipid Panel   Result Value Ref Range     Cholesterol 193 0 - 199 mg/dL     HDL 56.0 mg/dL     Cholesterol/HDL Ratio 3.4        (H) 0 - 99 mg/dL     VLDL 21 0 - 40 mg/dL     Triglycerides 103 0 - 149 mg/dL   CBC   Result Value Ref Range     WBC 5.5 4.4 - 11.3 x10E9/L     RBC 4.69 4.00 - 5.20 x10E12/L     Hemoglobin 13.8 12.0 - 16.0 g/dL     Hematocrit 44.2 36.0 - 46.0 %     MCV 94 80 - 100 fL     MCHC 31.2 (L) 32.0 - 36.0 g/dL     Platelets 247 150 - 450 x10E9/L     RDW 12.4 11.5 - 14.5 %   Comprehensive Metabolic Panel   Result Value Ref Range     Glucose 86 74 - 99 mg/dL     Sodium 141 136 - 145 mmol/L     Potassium 4.4 3.5 - 5.3 mmol/L     Chloride 107 98 - 107 mmol/L     Bicarbonate 28 21 - 32 mmol/L     Anion Gap 10 10 - 20 mmol/L     Urea Nitrogen 14 6 - 23 mg/dL     Creatinine 0.88 0.50 - 1.05 mg/dL     GFR Female 75 >90 mL/min/1.73m2     Calcium 9.2 8.6 - 10.3 mg/dL     Albumin 4.1 3.4 - 5.0 g/dL     Alkaline Phosphatase 80 33 - 110 U/L     Total Protein 6.6 6.4 - 8.2 g/dL     AST 17 9 - 39 U/L     Total Bilirubin 0.6 0.0 - 1.2 mg/dL     ALT (SGPT) 16 7 - 45 U/L            Medications:          Current Outpatient Medications   Medication Instructions    amoxicillin (AMOXIL) 875 mg, oral, 2 times daily     diazePAM (Valium) 10 mg tablet TAKE 1 TABLET BY MOUTH 1/2 HOUR PRIOR TO APPOINTMENT.    diazePAM (Valium) 10 mg tablet Take 1 tablet (10 mg) by mouth 30 minutes prior to appointment.    methylPREDNISolone (Medrol Dospak) 4 mg tablets Take by mouth as directed on package    metoprolol tartrate (Lopressor) 25 mg tablet TAKE 1 TABLET BY MOUTH TWO TIMES A DAY    oxybutynin XL (Ditropan-XL) 15 mg 24 hr tablet TAKE 2 TABLETS BY MOUTH ONCE DAILY    simvastatin (Zocor) 20 mg tablet TAKE 1 TABLET BY MOUTH EVERY NIGHT AT BEDTIME            Assessment:       This is a 58-year-old female with permanent atrial fibrillation and significant risk of falls.     The CHADS-VASC score is 3 and HAS-BLED score is 2. The patient is at increased risk of both bleeding and stroke.  As such the patient is a reasonable candidate for consideration of left atrial appendage occluder placement.     Proceed with LAAC     The risks discussed included but were not limited to vascular complications, sedation related complications, risk of MI, CVA, device embolization, pericardial tamponade and death. The patient verbalized understanding and decided to proceed.         Plan will be for preprocedural cardiac CT. Following device implant, strategy will be for dual antiplatelet therapy with aspirin and clopidogrel for 6 months then aspirin for life.

## 2024-03-04 ENCOUNTER — PATIENT OUTREACH (OUTPATIENT)
Dept: CARE COORDINATION | Facility: CLINIC | Age: 59
End: 2024-03-04
Payer: COMMERCIAL

## 2024-03-04 NOTE — PROGRESS NOTES
SHUBHAM call placed, member informed this CM that the procedure was not performed as planned due to insufficient parts.  She is rescheduled for this Friday for her Watchman.  This CM will contact her next week

## 2024-03-07 PROBLEM — Z95.818 PRESENCE OF WATCHMAN LEFT ATRIAL APPENDAGE CLOSURE DEVICE: Status: ACTIVE | Noted: 2024-03-07

## 2024-03-07 LAB
ATRIAL RATE: 62 BPM
P OFFSET: 181 MS
P ONSET: 125 MS
PR INTERVAL: 206 MS
Q ONSET: 222 MS
QRS COUNT: 10 BEATS
QRS DURATION: 98 MS
QT INTERVAL: 408 MS
QTC CALCULATION(BAZETT): 414 MS
QTC FREDERICIA: 412 MS
R AXIS: 72 DEGREES
T AXIS: 57 DEGREES
T OFFSET: 426 MS
VENTRICULAR RATE: 62 BPM

## 2024-03-08 ENCOUNTER — APPOINTMENT (OUTPATIENT)
Dept: CARDIOLOGY | Facility: HOSPITAL | Age: 59
DRG: 274 | End: 2024-03-08
Payer: COMMERCIAL

## 2024-03-08 ENCOUNTER — HOSPITAL ENCOUNTER (INPATIENT)
Facility: HOSPITAL | Age: 59
LOS: 1 days | Discharge: HOME | DRG: 274 | End: 2024-03-08
Attending: INTERNAL MEDICINE | Admitting: INTERNAL MEDICINE
Payer: COMMERCIAL

## 2024-03-08 ENCOUNTER — HOSPITAL ENCOUNTER (OUTPATIENT)
Dept: CARDIOLOGY | Facility: HOSPITAL | Age: 59
Discharge: HOME | End: 2024-03-08
Payer: COMMERCIAL

## 2024-03-08 VITALS
RESPIRATION RATE: 18 BRPM | HEIGHT: 70 IN | DIASTOLIC BLOOD PRESSURE: 60 MMHG | BODY MASS INDEX: 31.5 KG/M2 | SYSTOLIC BLOOD PRESSURE: 96 MMHG | WEIGHT: 220 LBS | OXYGEN SATURATION: 97 % | HEART RATE: 60 BPM

## 2024-03-08 DIAGNOSIS — Z95.818 PRESENCE OF AMULET LEFT ATRIAL APPENDAGE CLOSURE DEVICE: ICD-10-CM

## 2024-03-08 DIAGNOSIS — I48.91 ATRIAL FIBRILLATION, UNSPECIFIED TYPE (MULTI): Primary | ICD-10-CM

## 2024-03-08 DIAGNOSIS — I48.20 CHRONIC ATRIAL FIBRILLATION (MULTI): ICD-10-CM

## 2024-03-08 DIAGNOSIS — Z95.818 PRESENCE OF WATCHMAN LEFT ATRIAL APPENDAGE CLOSURE DEVICE: ICD-10-CM

## 2024-03-08 PROCEDURE — 93662 INTRACARDIAC ECG (ICE): CPT | Performed by: INTERNAL MEDICINE

## 2024-03-08 PROCEDURE — G0269 OCCLUSIVE DEVICE IN VEIN ART: HCPCS | Mod: TC,59 | Performed by: INTERNAL MEDICINE

## 2024-03-08 PROCEDURE — 93308 TTE F-UP OR LMTD: CPT | Performed by: INTERNAL MEDICINE

## 2024-03-08 PROCEDURE — 93010 ELECTROCARDIOGRAM REPORT: CPT | Performed by: INTERNAL MEDICINE

## 2024-03-08 PROCEDURE — 1200000002 HC GENERAL ROOM WITH TELEMETRY DAILY

## 2024-03-08 PROCEDURE — 7100000010 HC PHASE TWO TIME - EACH INCREMENTAL 1 MINUTE: Performed by: INTERNAL MEDICINE

## 2024-03-08 PROCEDURE — 7100000009 HC PHASE TWO TIME - INITIAL BASE CHARGE: Performed by: INTERNAL MEDICINE

## 2024-03-08 PROCEDURE — 93005 ELECTROCARDIOGRAM TRACING: CPT

## 2024-03-08 PROCEDURE — C1769 GUIDE WIRE: HCPCS | Performed by: INTERNAL MEDICINE

## 2024-03-08 PROCEDURE — 99153 MOD SED SAME PHYS/QHP EA: CPT | Performed by: INTERNAL MEDICINE

## 2024-03-08 PROCEDURE — 2500000001 HC RX 250 WO HCPCS SELF ADMINISTERED DRUGS (ALT 637 FOR MEDICARE OP): Performed by: INTERNAL MEDICINE

## 2024-03-08 PROCEDURE — C1759 CATH, INTRA ECHOCARDIOGRAPHY: HCPCS | Performed by: INTERNAL MEDICINE

## 2024-03-08 PROCEDURE — 99152 MOD SED SAME PHYS/QHP 5/>YRS: CPT | Performed by: INTERNAL MEDICINE

## 2024-03-08 PROCEDURE — C1760 CLOSURE DEV, VASC: HCPCS | Performed by: INTERNAL MEDICINE

## 2024-03-08 PROCEDURE — 02L73DK OCCLUSION OF LEFT ATRIAL APPENDAGE WITH INTRALUMINAL DEVICE, PERCUTANEOUS APPROACH: ICD-10-PCS | Performed by: INTERNAL MEDICINE

## 2024-03-08 PROCEDURE — C1889 IMPLANT/INSERT DEVICE, NOC: HCPCS | Performed by: INTERNAL MEDICINE

## 2024-03-08 PROCEDURE — 85347 COAGULATION TIME ACTIVATED: CPT | Performed by: INTERNAL MEDICINE

## 2024-03-08 PROCEDURE — 2500000004 HC RX 250 GENERAL PHARMACY W/ HCPCS (ALT 636 FOR OP/ED): Performed by: NURSE PRACTITIONER

## 2024-03-08 PROCEDURE — 2550000001 HC RX 255 CONTRASTS: Performed by: INTERNAL MEDICINE

## 2024-03-08 PROCEDURE — C1894 INTRO/SHEATH, NON-LASER: HCPCS | Performed by: INTERNAL MEDICINE

## 2024-03-08 PROCEDURE — 2780000003 HC OR 278 NO HCPCS: Performed by: INTERNAL MEDICINE

## 2024-03-08 PROCEDURE — 33340 PERQ CLSR TCAT L ATR APNDGE: CPT | Performed by: INTERNAL MEDICINE

## 2024-03-08 PROCEDURE — 2500000005 HC RX 250 GENERAL PHARMACY W/O HCPCS: Performed by: INTERNAL MEDICINE

## 2024-03-08 PROCEDURE — C1817 SEPTAL DEFECT IMP SYS: HCPCS | Performed by: INTERNAL MEDICINE

## 2024-03-08 PROCEDURE — 2720000007 HC OR 272 NO HCPCS: Performed by: INTERNAL MEDICINE

## 2024-03-08 PROCEDURE — 93308 TTE F-UP OR LMTD: CPT

## 2024-03-08 PROCEDURE — 2500000004 HC RX 250 GENERAL PHARMACY W/ HCPCS (ALT 636 FOR OP/ED): Performed by: INTERNAL MEDICINE

## 2024-03-08 PROCEDURE — 2500000001 HC RX 250 WO HCPCS SELF ADMINISTERED DRUGS (ALT 637 FOR MEDICARE OP): Performed by: NURSE PRACTITIONER

## 2024-03-08 PROCEDURE — C1893 INTRO/SHEATH, FIXED,NON-PEEL: HCPCS | Performed by: INTERNAL MEDICINE

## 2024-03-08 DEVICE — DELIVERY SHEATH
Type: IMPLANTABLE DEVICE | Status: NON-FUNCTIONAL
Brand: AMPLATZER™ TORQVUE™ 45° X 45°

## 2024-03-08 DEVICE — IMPLANTABLE DEVICE: Type: IMPLANTABLE DEVICE | Site: HEART | Status: FUNCTIONAL

## 2024-03-08 RX ORDER — TRAMADOL HYDROCHLORIDE 50 MG/1
50 TABLET ORAL EVERY 6 HOURS PRN
Status: DISCONTINUED | OUTPATIENT
Start: 2024-03-08 | End: 2024-03-08 | Stop reason: HOSPADM

## 2024-03-08 RX ORDER — ACETAMINOPHEN 160 MG/5ML
650 SOLUTION ORAL EVERY 6 HOURS PRN
Status: DISCONTINUED | OUTPATIENT
Start: 2024-03-08 | End: 2024-03-08 | Stop reason: HOSPADM

## 2024-03-08 RX ORDER — MIDAZOLAM HYDROCHLORIDE 1 MG/ML
INJECTION, SOLUTION INTRAMUSCULAR; INTRAVENOUS AS NEEDED
Status: DISCONTINUED | OUTPATIENT
Start: 2024-03-08 | End: 2024-03-08 | Stop reason: HOSPADM

## 2024-03-08 RX ORDER — CLOPIDOGREL BISULFATE 300 MG/1
TABLET, FILM COATED ORAL AS NEEDED
Status: DISCONTINUED | OUTPATIENT
Start: 2024-03-08 | End: 2024-03-08 | Stop reason: HOSPADM

## 2024-03-08 RX ORDER — HEPARIN SODIUM 1000 [USP'U]/ML
INJECTION, SOLUTION INTRAVENOUS; SUBCUTANEOUS AS NEEDED
Status: DISCONTINUED | OUTPATIENT
Start: 2024-03-08 | End: 2024-03-08 | Stop reason: HOSPADM

## 2024-03-08 RX ORDER — CLOPIDOGREL BISULFATE 75 MG/1
75 TABLET ORAL DAILY
Status: DISCONTINUED | OUTPATIENT
Start: 2024-03-08 | End: 2024-03-08 | Stop reason: HOSPADM

## 2024-03-08 RX ORDER — FENTANYL CITRATE 50 UG/ML
INJECTION, SOLUTION INTRAMUSCULAR; INTRAVENOUS AS NEEDED
Status: DISCONTINUED | OUTPATIENT
Start: 2024-03-08 | End: 2024-03-08 | Stop reason: HOSPADM

## 2024-03-08 RX ORDER — CEFAZOLIN SODIUM 2 G/100ML
2 INJECTION, SOLUTION INTRAVENOUS ONCE
Status: DISCONTINUED | OUTPATIENT
Start: 2024-03-08 | End: 2024-03-08 | Stop reason: HOSPADM

## 2024-03-08 RX ORDER — NAPROXEN SODIUM 220 MG/1
324 TABLET, FILM COATED ORAL ONCE
Status: COMPLETED | OUTPATIENT
Start: 2024-03-08 | End: 2024-03-08

## 2024-03-08 RX ORDER — ONDANSETRON 4 MG/1
4 TABLET, ORALLY DISINTEGRATING ORAL EVERY 8 HOURS PRN
Status: DISCONTINUED | OUTPATIENT
Start: 2024-03-08 | End: 2024-03-08 | Stop reason: HOSPADM

## 2024-03-08 RX ORDER — ASPIRIN 81 MG/1
81 TABLET ORAL DAILY
Status: DISCONTINUED | OUTPATIENT
Start: 2024-03-08 | End: 2024-03-08 | Stop reason: HOSPADM

## 2024-03-08 RX ORDER — DOCUSATE SODIUM 100 MG/1
100 CAPSULE, LIQUID FILLED ORAL 2 TIMES DAILY
Status: DISCONTINUED | OUTPATIENT
Start: 2024-03-08 | End: 2024-03-08 | Stop reason: HOSPADM

## 2024-03-08 RX ORDER — ONDANSETRON HYDROCHLORIDE 2 MG/ML
4 INJECTION, SOLUTION INTRAVENOUS EVERY 8 HOURS PRN
Status: DISCONTINUED | OUTPATIENT
Start: 2024-03-08 | End: 2024-03-08 | Stop reason: HOSPADM

## 2024-03-08 RX ORDER — PANTOPRAZOLE SODIUM 40 MG/1
40 TABLET, DELAYED RELEASE ORAL
Status: DISCONTINUED | OUTPATIENT
Start: 2024-03-09 | End: 2024-03-08 | Stop reason: HOSPADM

## 2024-03-08 RX ORDER — ACETAMINOPHEN 650 MG/1
650 SUPPOSITORY RECTAL EVERY 6 HOURS PRN
Status: DISCONTINUED | OUTPATIENT
Start: 2024-03-08 | End: 2024-03-08 | Stop reason: HOSPADM

## 2024-03-08 RX ORDER — PANTOPRAZOLE SODIUM 40 MG/10ML
40 INJECTION, POWDER, LYOPHILIZED, FOR SOLUTION INTRAVENOUS
Status: DISCONTINUED | OUTPATIENT
Start: 2024-03-09 | End: 2024-03-08 | Stop reason: HOSPADM

## 2024-03-08 RX ORDER — LIDOCAINE HYDROCHLORIDE 20 MG/ML
INJECTION, SOLUTION INFILTRATION; PERINEURAL AS NEEDED
Status: DISCONTINUED | OUTPATIENT
Start: 2024-03-08 | End: 2024-03-08 | Stop reason: HOSPADM

## 2024-03-08 RX ORDER — SODIUM CHLORIDE, SODIUM LACTATE, POTASSIUM CHLORIDE, CALCIUM CHLORIDE 600; 310; 30; 20 MG/100ML; MG/100ML; MG/100ML; MG/100ML
75 INJECTION, SOLUTION INTRAVENOUS CONTINUOUS
Status: DISCONTINUED | OUTPATIENT
Start: 2024-03-08 | End: 2024-03-08 | Stop reason: HOSPADM

## 2024-03-08 RX ORDER — PROTAMINE SULFATE 10 MG/ML
INJECTION, SOLUTION INTRAVENOUS CONTINUOUS PRN
Status: COMPLETED | OUTPATIENT
Start: 2024-03-08 | End: 2024-03-08

## 2024-03-08 RX ORDER — CLOPIDOGREL BISULFATE 75 MG/1
75 TABLET ORAL DAILY
Qty: 180 TABLET | Refills: 0 | Status: SHIPPED | OUTPATIENT
Start: 2024-03-08

## 2024-03-08 RX ORDER — CEFAZOLIN 1 G/1
INJECTION, POWDER, FOR SOLUTION INTRAVENOUS AS NEEDED
Status: DISCONTINUED | OUTPATIENT
Start: 2024-03-08 | End: 2024-03-08 | Stop reason: HOSPADM

## 2024-03-08 RX ORDER — ACETAMINOPHEN 325 MG/1
650 TABLET ORAL EVERY 6 HOURS PRN
Status: DISCONTINUED | OUTPATIENT
Start: 2024-03-08 | End: 2024-03-08 | Stop reason: HOSPADM

## 2024-03-08 RX ORDER — CLOPIDOGREL BISULFATE 75 MG/1
TABLET ORAL DAILY
COMMUNITY
End: 2024-03-15 | Stop reason: WASHOUT

## 2024-03-08 RX ADMIN — SODIUM CHLORIDE, POTASSIUM CHLORIDE, SODIUM LACTATE AND CALCIUM CHLORIDE 75 ML/HR: 600; 310; 30; 20 INJECTION, SOLUTION INTRAVENOUS at 07:00

## 2024-03-08 RX ADMIN — ASPIRIN 81 MG CHEWABLE TABLET 324 MG: 81 TABLET CHEWABLE at 07:00

## 2024-03-08 ASSESSMENT — COLUMBIA-SUICIDE SEVERITY RATING SCALE - C-SSRS
6. HAVE YOU EVER DONE ANYTHING, STARTED TO DO ANYTHING, OR PREPARED TO DO ANYTHING TO END YOUR LIFE?: NO
1. IN THE PAST MONTH, HAVE YOU WISHED YOU WERE DEAD OR WISHED YOU COULD GO TO SLEEP AND NOT WAKE UP?: NO
2. HAVE YOU ACTUALLY HAD ANY THOUGHTS OF KILLING YOURSELF?: NO

## 2024-03-08 NOTE — DISCHARGE INSTRUCTIONS
NICOLAS Ruiz Discharge Instructions  Anticoagulation Plan:  You are being discharged on Aspirin and Plavix for 6 months (Plavix will be stopped after 6 months and you will remain on 81mg Aspirin for life).  You will have a 4 month CTA to assess the effectiveness of the Watchman Device.     General Instructions:  DO NOT drink any alcoholic drinks or take any non-prescriptive medications that contain alcohol for the first 24 hours.   DO NOT make any important decisions for the first 24 hours.  Activity:  You are advised to go directly home from the hospital.   DO NOT lift anything heavier than 10 pounds for one week, this allows for proper healing of the groin.   No excessive exercise or treadmill use for one week. You may walk and do stairs, slowly.   No sexual activities for 24 hours after you arrive home.    Wound Care:  If slight bleeding should occur at site, lie down and have someone apply firm pressure just above the puncture site for 5 minutes.  If it continues or is profuse, call 911. Always notify your doctor if bleeding occurs.   Keep site clean and dry. Let air dry or you may use a simple bandaid.   Gently cleanse the puncture site in your groin with soap and water only.   You may experience some tenderness, bruising or minimal inflammation.  If you have any concerns, you may contact the Cath Lab or if any of these symptoms become excessive, contact your cardiologist or go to the emergency room.   No tub baths, soaking, or swimming for one week.   May shower the next day after your procedure.    Diet:  You may resume your normal diet. However it is better to start with liquids such as juices then soup and crackers, and gradually work up to solid foods.    Other Instructions:  If you develop difficulty breathing, rash, hives, severe nausea, vomiting, light-headedness or any signs of infection, immediately contact your doctor and go to the nearest emergency room.   You must take your aspirin, clopidogrel  (Plavix), prasugrel (Effient), or ticagrelor (Brilinta) every day without missing a single dose.  If you are getting low on these medications, contact your physician immediately for a refill.  - CALL 911 IF YOU HAVE ANY OF THE SIGNS AND SYMPTOMS OF HEART FAILURE: 1. Chest pain 2. Significant Shortness of breath 3. Fainting.     Call Provider If (Home-going Patients):  Breathing faster than normal.   Breathing harder than normal or having retractions.   Fever of 100.4 F (38 C) or higher.   Chills.   Drinking less than normal.   Urinating less than normal, over 1 day.   Acting very sleepy and difficult to awaken.   Vomiting (throwing up) and not able to eat or drink for 12 hours.   3 or more loose, watery bowel movements in 24 hours (diarrhea).   Any new concerning symptoms.

## 2024-03-08 NOTE — POST-PROCEDURE NOTE
Physician Transition of Care Summary  Invasive Cardiovascular Lab    Procedure Date: 3/8/2024  Attending:    * Avtar Rosas - Primary  Resident/Fellow/Other Assistant: Surgeon(s) and Role:     * Keith Lang MD - Fellow     * Shmuel Bonilla MD - Fellow    Indications:   Pre-op Diagnosis     * Atrial fibrillation, unspecified type (CMS/HCC) [I48.91]    Post-procedure diagnosis:   Post-op Diagnosis     * Atrial fibrillation, unspecified type (CMS/HCC) [I48.91]    Procedure(s):   LAAO (Left Atrial Appendage Occlusion)  21794 - SC PERQ CLSR TCAT L ATR APNDGE W/ENDOCARDIAL IMPLNT    SC PERQ CLSR TCAT L ATR APNDGE W/ENDOCARDIAL IMPLNT [78027]      Description of the Procedure:   S/p MARCIN closure    Access: Dual right femoral vein access s/p proglide and vascade closure devices.     Device: After confirmation of the device position on fluoroscopy and ICE, anchor with a tug test, compression and seal a 28 mm Amulet device was successfully deployed without any immediate complications.     No effusion on ICE was present pre and post Amulet deployment.     Recommendations:   ASA and Plavix for 6 months followed by ASA for life   CT in 4 months  Access site monitoring.   TTE today  Likely discharge home today once recovery and monitoring period is complete.      Complications:   none    Estimated Blood Loss:   10 mL    Anesthesia: Moderate Sedation Anesthesia Staff: No anesthesia staff entered.    Any Specimen(s) Removed:   Order Name Source Comment Collection Info Order Time   BASIC METABOLIC PANEL Blood, Venous   3/8/2024  6:43 AM     Release result to MyChart   Immediate        CBC WITH AUTO DIFFERENTIAL Blood, Venous   3/8/2024  6:43 AM     Release result to MyChart   Immediate        PROTIME-INR Blood, Venous   3/8/2024  6:43 AM     Release result to MyChart   Immediate        BASIC METABOLIC PANEL Blood, Venous   3/8/2024  9:56 AM     Release result to MyChart   Immediate        MAGNESIUM Blood, Venous   3/8/2024  9:56  AM     Release result to [facility]   Immediate        CBC WITH AUTO DIFFERENTIAL Blood, Venous   3/8/2024  9:56 AM     Release result to [facility]   Immediate        PROTIME-INR Blood, Venous   3/8/2024  9:56 AM     Release result to [facility]   Immediate            Electronically signed by: Keith Lang MD, 3/8/2024 11:12 AM

## 2024-03-08 NOTE — DISCHARGE SUMMARY
Discharge Diagnosis  Atrial fibrillation, unspecified type (CMS/[facility])      Test Results Pending At Discharge  Pending Labs       No current pending labs.            Hospital Course    Description of the Procedure:   S/p MARCIN closure     Access: Dual right femoral vein access s/p proglide and vascade closure devices.      Device: After confirmation of the device position on fluoroscopy and ICE, anchor with a tug test, compression and seal a 28 mm Amulet device was successfully deployed without any immediate complications.      No effusion on ICE was present pre and post Amulet deployment.      Recommendations:   ASA and Plavix for 6 months followed by ASA for life   CT in 4 months  Access site monitoring.   TTE today  Likely discharge home today once recovery and monitoring period is complete.        Complications:   none    Pertinent Physical Exam At Time of Discharge  AO x 3  CVS- normal s1, s1, no murmurs  Resp -CTAB  Abd- Soft, NT, ND  Neuro  No gross motor. Sensory deficits   Groin access sites no hematoma, swelling   No LE edema       Home Medications     Medication List      ASK your doctor about these medications     amitriptyline 10 mg tablet; Commonly known as: Elavil   aspirin 81 mg EC tablet   clopidogrel 75 mg tablet; Commonly known as: Plavix; Ask about: Which   instructions should I use?   cyclobenzaprine 5 mg tablet; Commonly known as: Flexeril   melatonin 10 mg tablet,ext release multiphase   metoprolol tartrate 25 mg tablet; Commonly known as: Lopressor; TAKE 1   TABLET BY MOUTH TWO TIMES A DAY   omeprazole 20 mg DR capsule; Commonly known as: PriLOSEC   oxybutynin XL 15 mg 24 hr tablet; Commonly known as: Ditropan-XL; TAKE 2   TABLETS BY MOUTH ONCE DAILY   rOPINIRole 0.25 mg tablet; Commonly known as: Requip   sertraline 100 mg tablet; Commonly known as: Zoloft   simvastatin 20 mg tablet; Commonly known as: Zocor; TAKE 1 TABLET BY   MOUTH EVERY NIGHT AT BEDTIME   sucralfate 1 gram tablet; Commonly  known as: Carafate   UribeL 118-10-40.8-36 mg capsule; Generic drug:   methen-m.blue-s.phos-phsal-hyo       Outpatient Follow-Up  Future Appointments   Date Time Provider Department Center   3/11/2024  7:00 AM [facility] 1 [id number] ELY [place]    3/11/2024  8:00 AM RANJIT [facility] [id number] [place]   4/1/2024  9:[facility] [id number] [place]   4/8/2024  8:40 AM Pilar Arredondo APRSYLVIA-[facility] [id number] [place]   7/2/2024 10:15 AM ELY [id number] CT 1 [id number] ELY [place]        Keith Lang MD

## 2024-03-08 NOTE — PROGRESS NOTES
"Pharmacy Medication History Review    Yarelis Brantley \"Thu\" is a 59 y.o. female admitted for Atrial fibrillation, unspecified type (CMS/[facility]). Pharmacy reviewed the patient's dvfmd-zn-mwiudosif medications for accuracy.    The list below reflects the updated PTA list. Comments regarding how patient may be taking medications differently can be found in the Admit Orders Activity  Prior to Admission Medications   Prescriptions Last Dose Informant   amitriptyline (Elavil) 10 mg tablet 3/1/2024 Self   Sig: Take 1 tablet (10 mg) by mouth as needed at bedtime for sleep.   aspirin 81 mg EC tablet 3/7/2024 Self   Sig: Take 1 tablet (81 mg) by mouth once daily.   clopidogrel (Plavix) 75 mg tablet 2/29/2024 Self   Sig: Take by mouth once daily.   cyclobenzaprine (Flexeril) 5 mg tablet Past Month Self   Sig: Take 1 tablet (5 mg) by mouth once daily as needed.   melatonin 10 mg tablet,ext release multiphase 3/7/2024 Self   Sig: Take 1 tablet by mouth once daily at bedtime.   methen-m.blue-s.phos-phsal-hyo (UribeL) 118-10-40.8-36 mg capsule Past Month Self   Sig: Take 1 capsule by mouth every 8 hours if needed.   metoprolol tartrate (Lopressor) 25 mg tablet 3/7/2024 Self   Sig: TAKE 1 TABLET BY MOUTH TWO TIMES A DAY   omeprazole (PriLOSEC) 20 mg DR capsule 3/7/2024 Self   Sig: Take 1 capsule (20 mg) by mouth once daily.   oxybutynin XL (Ditropan-XL) 15 mg 24 hr tablet 3/7/2024 Self   Sig: TAKE 2 TABLETS BY MOUTH ONCE DAILY   rOPINIRole (Requip) 0.25 mg tablet Past Month Self   Sig: Take 1 tablet (0.25 mg) by mouth as needed at bedtime.   sertraline (Zoloft) 100 mg tablet 3/7/2024 Self   Sig: Take 1 tablet (100 mg) by mouth once daily.   simvastatin (Zocor) 20 mg tablet 3/7/2024 Self   Sig: TAKE 1 TABLET BY MOUTH EVERY NIGHT AT BEDTIME   sucralfate (Carafate) 1 gram tablet Past Month Self   Sig: Take 1 tablet (1 g) by mouth once daily as needed.      Facility-Administered Medications: None          Patient declines M2B at discharge. " Pharmacy has been updated to  [facility].    Sources:    -patient interview  -outpatient pharmacy dispense history  -Care Everywhere medication lists  -OARRS  -pharmacy admission med rec 3/1  -[facility] discharge med list 3/1      Below are additional concerns with the patient's PTA list: none      Rigo Chung, PharmD  Transitions of Care Pharmacist  [facility]  Please reach out via Secure Chat for questions, or if no response call Active[employer] or vocera [facility]

## 2024-03-09 ENCOUNTER — HOSPITAL ENCOUNTER (EMERGENCY)
Facility: HOSPITAL | Age: 59
Discharge: HOME | End: 2024-03-09
Attending: STUDENT IN AN ORGANIZED HEALTH CARE EDUCATION/TRAINING PROGRAM
Payer: COMMERCIAL

## 2024-03-09 VITALS
TEMPERATURE: 97.7 F | OXYGEN SATURATION: 99 % | SYSTOLIC BLOOD PRESSURE: 148 MMHG | HEIGHT: 70 IN | HEART RATE: 83 BPM | WEIGHT: 220 LBS | DIASTOLIC BLOOD PRESSURE: 72 MMHG | BODY MASS INDEX: 31.5 KG/M2 | RESPIRATION RATE: 18 BRPM

## 2024-03-09 DIAGNOSIS — Z51.89 VISIT FOR WOUND CHECK: Primary | ICD-10-CM

## 2024-03-09 LAB
ALBUMIN SERPL BCP-MCNC: 4.2 G/DL (ref 3.4–5)
ALP SERPL-CCNC: 79 U/L (ref 33–136)
ALT SERPL W P-5'-P-CCNC: 16 U/L (ref 7–45)
ANION GAP SERPL CALC-SCNC: 12 MMOL/L (ref 10–20)
AST SERPL W P-5'-P-CCNC: 21 U/L (ref 9–39)
BASOPHILS # BLD AUTO: 0.04 X10*3/UL (ref 0–0.1)
BASOPHILS NFR BLD AUTO: 0.5 %
BILIRUB SERPL-MCNC: 0.7 MG/DL (ref 0–1.2)
BUN SERPL-MCNC: 12 MG/DL (ref 6–23)
CALCIUM SERPL-MCNC: 9.6 MG/DL (ref 8.6–10.3)
CHLORIDE SERPL-SCNC: 106 MMOL/L (ref 98–107)
CO2 SERPL-SCNC: 25 MMOL/L (ref 21–32)
CREAT SERPL-MCNC: 0.8 MG/DL (ref 0.5–1.05)
EGFRCR SERPLBLD CKD-EPI 2021: 84 ML/MIN/1.73M*2
EOSINOPHIL # BLD AUTO: 0.36 X10*3/UL (ref 0–0.7)
EOSINOPHIL NFR BLD AUTO: 4.6 %
ERYTHROCYTE [DISTWIDTH] IN BLOOD BY AUTOMATED COUNT: 12.7 % (ref 11.5–14.5)
GLUCOSE SERPL-MCNC: 105 MG/DL (ref 74–99)
HCT VFR BLD AUTO: 42 % (ref 36–46)
HGB BLD-MCNC: 14 G/DL (ref 12–16)
IMM GRANULOCYTES # BLD AUTO: 0.03 X10*3/UL (ref 0–0.7)
IMM GRANULOCYTES NFR BLD AUTO: 0.4 % (ref 0–0.9)
LYMPHOCYTES # BLD AUTO: 1.32 X10*3/UL (ref 1.2–4.8)
LYMPHOCYTES NFR BLD AUTO: 17 %
MCH RBC QN AUTO: 30.7 PG (ref 26–34)
MCHC RBC AUTO-ENTMCNC: 33.3 G/DL (ref 32–36)
MCV RBC AUTO: 92 FL (ref 80–100)
MONOCYTES # BLD AUTO: 0.59 X10*3/UL (ref 0.1–1)
MONOCYTES NFR BLD AUTO: 7.6 %
NEUTROPHILS # BLD AUTO: 5.44 X10*3/UL (ref 1.2–7.7)
NEUTROPHILS NFR BLD AUTO: 69.9 %
NRBC BLD-RTO: 0 /100 WBCS (ref 0–0)
PLATELET # BLD AUTO: 223 X10*3/UL (ref 150–450)
POTASSIUM SERPL-SCNC: 4.2 MMOL/L (ref 3.5–5.3)
PROT SERPL-MCNC: 7.2 G/DL (ref 6.4–8.2)
RBC # BLD AUTO: 4.56 X10*6/UL (ref 4–5.2)
SODIUM SERPL-SCNC: 139 MMOL/L (ref 136–145)
WBC # BLD AUTO: 7.8 X10*3/UL (ref 4.4–11.3)

## 2024-03-09 PROCEDURE — 80053 COMPREHEN METABOLIC PANEL: CPT | Performed by: STUDENT IN AN ORGANIZED HEALTH CARE EDUCATION/TRAINING PROGRAM

## 2024-03-09 PROCEDURE — 99284 EMERGENCY DEPT VISIT MOD MDM: CPT

## 2024-03-09 PROCEDURE — 85025 COMPLETE CBC W/AUTO DIFF WBC: CPT | Performed by: STUDENT IN AN ORGANIZED HEALTH CARE EDUCATION/TRAINING PROGRAM

## 2024-03-09 PROCEDURE — 2500000004 HC RX 250 GENERAL PHARMACY W/ HCPCS (ALT 636 FOR OP/ED): Performed by: STUDENT IN AN ORGANIZED HEALTH CARE EDUCATION/TRAINING PROGRAM

## 2024-03-09 PROCEDURE — 36415 COLL VENOUS BLD VENIPUNCTURE: CPT | Performed by: STUDENT IN AN ORGANIZED HEALTH CARE EDUCATION/TRAINING PROGRAM

## 2024-03-09 PROCEDURE — 96374 THER/PROPH/DIAG INJ IV PUSH: CPT

## 2024-03-09 RX ORDER — ONDANSETRON HYDROCHLORIDE 2 MG/ML
4 INJECTION, SOLUTION INTRAVENOUS ONCE
Status: COMPLETED | OUTPATIENT
Start: 2024-03-09 | End: 2024-03-09

## 2024-03-09 RX ADMIN — ONDANSETRON 4 MG: 2 INJECTION INTRAMUSCULAR; INTRAVENOUS at 19:15

## 2024-03-09 ASSESSMENT — LIFESTYLE VARIABLES
EVER HAD A DRINK FIRST THING IN THE MORNING TO STEADY YOUR NERVES TO GET RID OF A HANGOVER: NO
HAVE PEOPLE ANNOYED YOU BY CRITICIZING YOUR DRINKING: NO
EVER FELT BAD OR GUILTY ABOUT YOUR DRINKING: NO
HAVE YOU EVER FELT YOU SHOULD CUT DOWN ON YOUR DRINKING: NO

## 2024-03-09 ASSESSMENT — COLUMBIA-SUICIDE SEVERITY RATING SCALE - C-SSRS
6. HAVE YOU EVER DONE ANYTHING, STARTED TO DO ANYTHING, OR PREPARED TO DO ANYTHING TO END YOUR LIFE?: NO
2. HAVE YOU ACTUALLY HAD ANY THOUGHTS OF KILLING YOURSELF?: NO
1. IN THE PAST MONTH, HAVE YOU WISHED YOU WERE DEAD OR WISHED YOU COULD GO TO SLEEP AND NOT WAKE UP?: NO

## 2024-03-09 ASSESSMENT — PAIN SCALES - GENERAL: PAINLEVEL_OUTOF10: 4

## 2024-03-09 ASSESSMENT — PAIN - FUNCTIONAL ASSESSMENT: PAIN_FUNCTIONAL_ASSESSMENT: 0-10

## 2024-03-10 NOTE — ED PROVIDER NOTES
HPI   Chief Complaint   Patient presents with    Post-op Problem       Patient is a 59-year-old female with history of pacemaker, Watchman procedure on March 8 who presents for bleeding.  Patient states she was bleeding from her wound in her right groin after the procedure yesterday.  Was discharged home and bleeding continued.  States went to urgent care earlier today and they sent her to the emergency department.  No lightheadedness, chest pain, shortness of breath, vomiting or diarrhea.  Does Dors mild nausea.  No anticoagulation use at this time.  States she took it baby aspirin yesterday, none today.                          No data recorded                   Patient History   Past Medical History:   Diagnosis Date    Dorsalgia, unspecified     Disabling back pain    Encounter for follow-up examination after completed treatment for conditions other than malignant neoplasm 12/14/2021    Encounter for examination following treatment at hospital    Encounter for other administrative examinations 12/14/2021    Encounter for completion of form with patient    Epigastric abdominal tenderness 12/14/2021    Epigastric abdominal tenderness    Fever, unspecified 12/10/2021    Low grade fever    Other conditions influencing health status 12/10/2021    History of cough    Other disturbances of smell and taste 12/10/2021    COVID-19 long hauler manifesting chronic loss of smell and taste    Personal history of other diseases of the circulatory system 03/21/2017    History of essential hypertension    Personal history of other diseases of the circulatory system 03/21/2017    History of atrial fibrillation    Personal history of other diseases of the circulatory system     History of paroxysmal supraventricular tachycardia    Personal history of other diseases of the digestive system 03/21/2017    History of esophageal reflux    Personal history of other diseases of the digestive system 01/19/2022    History of chronic  constipation    Personal history of other diseases of the digestive system     History of esophageal spasm    Personal history of other diseases of the digestive system     History of gastroesophageal reflux (GERD)    Personal history of other diseases of the musculoskeletal system and connective tissue 12/20/2017    History of low back pain    Personal history of other endocrine, nutritional and metabolic disease 03/21/2017    History of hyperlipidemia    Personal history of other medical treatment     History of screening mammography    Personal history of other mental and behavioral disorders 03/21/2017    History of depression    Personal history of other mental and behavioral disorders     History of depression    Personal history of other specified conditions 01/20/2022    History of diarrhea    Personal history of other specified conditions 01/20/2022    History of nausea and vomiting    Personal history of other specified conditions 01/20/2022    History of epigastric pain    Personal history of other specified conditions 07/15/2021    History of dysphagia    Personal history of other specified conditions 12/14/2021    History of abdominal pain    Personal history of other specified conditions     History of chest pain    Personal history of other specified conditions     History of syncope    Personal history of other specified conditions     History of vomiting    Personal history of other specified conditions 12/04/2019    History of urinary frequency    Personal history of urinary (tract) infections 12/26/2019    History of urinary tract infection    Shortness of breath     SOBOE (shortness of breath on exertion)    Urge incontinence 11/13/2020    Urge incontinence     Past Surgical History:   Procedure Laterality Date    APPENDECTOMY  03/21/2017    Appendectomy    CARDIAC CATHETERIZATION N/A 3/1/2024    Procedure: LAAO (Left Atrial Appendage Occlusion);  Surgeon: Avtar Rosas MD;  Location: [facility]  3529 Cardiac Cath Lab;  Service: Cardiovascular;  Laterality: N/A;  SD CT at 0815/[employer]    CARDIAC PACEMAKER PLACEMENT  2017    Pacemaker Placement     SECTION, CLASSIC  2017     Section    CHOLECYSTECTOMY  2017    Cholecystectomy    COLONOSCOPY  2017    Complete Colonoscopy    CT ANGIO NECK  1/15/2023    CT NECK ANGIO W AND WO IV CONTRAST 1/15/2023 DOCTOR OFFICE LEGACY    CT HEAD ANGIO W AND WO IV CONTRAST  1/15/2023    CT HEAD ANGIO W AND WO IV CONTRAST 1/15/2023 DOCTOR OFFICE LEGACY    HYSTERECTOMY  2017    Hysterectomy    MR HEAD ANGIO WO IV CONTRAST  2023    MR HEAD ANGIO WO IV CONTRAST 2023 DOCTOR OFFICE LEGACY    MR NECK ANGIO WO IV CONTRAST  2023    MR NECK ANGIO WO IV CONTRAST 2023 DOCTOR OFFICE LEGACY    OTHER SURGICAL HISTORY  10/09/2021    Catheter ablation     Family History   Problem Relation Name Age of Onset    Leukemia Mother      Coronary artery disease Father       Social History     Tobacco Use    Smoking status: Former     Types: Cigarettes    Smokeless tobacco: Never   Substance Use Topics    Alcohol use: Not Currently     Comment: rarely    Drug use: Never       Physical Exam   ED Triage Vitals [24 1842]   Temperature Heart Rate Respirations BP   36.5 °C (97.7 °F) 81 18 136/68      Pulse Ox Temp Source Heart Rate Source Patient Position   98 % Temporal Monitor Sitting      BP Location FiO2 (%)     Right arm --       Physical Exam  Constitutional:       General: She is not in acute distress.  HENT:      Head: Normocephalic.   Eyes:      Extraocular Movements: Extraocular movements intact.      Conjunctiva/sclera: Conjunctivae normal.      Pupils: Pupils are equal, round, and reactive to light.   Cardiovascular:      Rate and Rhythm: Normal rate and regular rhythm.      Pulses: Normal pulses.      Heart sounds: Normal heart sounds.   Pulmonary:      Effort: Pulmonary effort is normal.      Breath sounds: Normal  breath sounds.   Abdominal:      General: There is no distension.      Palpations: Abdomen is soft. There is no mass.      Tenderness: There is no abdominal tenderness. There is no guarding.   Musculoskeletal:         General: No deformity.      Cervical back: Normal range of motion and neck supple.      Right lower leg: No edema.      Left lower leg: No edema.      Comments: 2+ DP pulses bilaterally.  Approximately 1 cm incision in the right proximal thigh with slow oozing.  No appreciable edema/induration/fluid collection.    Skin:     General: Skin is warm and dry.      Findings: No lesion or rash.   Neurological:      General: No focal deficit present.      Mental Status: She is alert and oriented to person, place, and time. Mental status is at baseline.      Cranial Nerves: No cranial nerve deficit.      Sensory: No sensory deficit.      Motor: No weakness.   Psychiatric:         Mood and Affect: Mood normal.         ED Course & MDM   Diagnoses as of 03/10/24 1119   Visit for wound check       Medical Decision Making  Patient is a 59-year-old female with above-stated past medical history who presents for bleeding.  Patient's CMP and CBC are grossly unremarkable.  No indication for transfusion at this time.  Patient is clinically well-appearing nontoxic.  Her vital signs are stable.  I placed a folded 4 x 4 gauze over the wound and a compression wrap was placed to provide continuous pressure.  Patient maintained 2+ DP pulse in her right leg.  I discussed the patient's case with Dr. Rosas of cardiac surgery who did not feel imaging was indicated and that is the patient's bleeding can be controlled she can be discharged home with outpatient follow-up.  On reevaluation, patient's bleeding has stopped.  I did reapply a light pressure dressing to the wound to reduce the risk of rebleeding as she ambulated home and while sleeping.  2+ DP pulse in her right leg.  I gave the patient strict turn precautions.  She stated  understanding agreement.  She was discharged home in stable condition.    Disclaimer: This note was dictated using speech recognition software. Minor errors in transcription may be present. Please call if questions.     Emeka Kimble MD  [facility] Emergency Medicine  Contact on [employer]        Problems Addressed:  Visit for wound check: acute illness or injury    Amount and/or Complexity of Data Reviewed  Labs: ordered.        Procedure  Procedures     Emeka Kimble MD  03/10/24 1122

## 2024-03-11 ENCOUNTER — TELEPHONE (OUTPATIENT)
Dept: CARDIOLOGY | Facility: HOSPITAL | Age: 59
End: 2024-03-11

## 2024-03-11 ENCOUNTER — APPOINTMENT (OUTPATIENT)
Dept: CARDIOLOGY | Facility: HOSPITAL | Age: 59
End: 2024-03-11
Payer: COMMERCIAL

## 2024-03-11 ENCOUNTER — APPOINTMENT (OUTPATIENT)
Dept: CARDIOLOGY | Facility: CLINIC | Age: 59
End: 2024-03-11
Payer: COMMERCIAL

## 2024-03-11 ENCOUNTER — PATIENT OUTREACH (OUTPATIENT)
Dept: CARE COORDINATION | Facility: CLINIC | Age: 59
End: 2024-03-11

## 2024-03-12 ENCOUNTER — TELEPHONE (OUTPATIENT)
Dept: CARDIOLOGY | Facility: HOSPITAL | Age: 59
End: 2024-03-12
Payer: COMMERCIAL

## 2024-03-15 ENCOUNTER — HOSPITAL ENCOUNTER (OUTPATIENT)
Dept: CARDIOLOGY | Facility: HOSPITAL | Age: 59
Discharge: HOME | End: 2024-03-15
Payer: COMMERCIAL

## 2024-03-15 ENCOUNTER — PHARMACY VISIT (OUTPATIENT)
Dept: PHARMACY | Facility: CLINIC | Age: 59
End: 2024-03-15
Payer: COMMERCIAL

## 2024-03-15 ENCOUNTER — OFFICE VISIT (OUTPATIENT)
Dept: CARDIOLOGY | Facility: CLINIC | Age: 59
End: 2024-03-15
Payer: COMMERCIAL

## 2024-03-15 VITALS
SYSTOLIC BLOOD PRESSURE: 118 MMHG | DIASTOLIC BLOOD PRESSURE: 64 MMHG | WEIGHT: 205 LBS | BODY MASS INDEX: 29.35 KG/M2 | HEART RATE: 86 BPM | HEIGHT: 70 IN

## 2024-03-15 DIAGNOSIS — I48.0 PAROXYSMAL ATRIAL FIBRILLATION (MULTI): ICD-10-CM

## 2024-03-15 DIAGNOSIS — Z95.0 CARDIAC PACEMAKER IN SITU: Primary | ICD-10-CM

## 2024-03-15 DIAGNOSIS — G90.9 AUTONOMIC DYSFUNCTION: ICD-10-CM

## 2024-03-15 DIAGNOSIS — E78.2 MIXED HYPERLIPIDEMIA: ICD-10-CM

## 2024-03-15 DIAGNOSIS — I49.5 SICK SINUS SYNDROME (MULTI): ICD-10-CM

## 2024-03-15 DIAGNOSIS — Z95.818 PRESENCE OF AMULET LEFT ATRIAL APPENDAGE CLOSURE DEVICE: ICD-10-CM

## 2024-03-15 DIAGNOSIS — Z95.0 PACEMAKER: ICD-10-CM

## 2024-03-15 DIAGNOSIS — E78.2 MIXED DYSLIPIDEMIA: ICD-10-CM

## 2024-03-15 DIAGNOSIS — I48.91 ATRIAL FIBRILLATION, UNSPECIFIED TYPE (MULTI): ICD-10-CM

## 2024-03-15 PROCEDURE — 1036F TOBACCO NON-USER: CPT | Performed by: NURSE PRACTITIONER

## 2024-03-15 PROCEDURE — 99214 OFFICE O/P EST MOD 30 MIN: CPT | Performed by: NURSE PRACTITIONER

## 2024-03-15 PROCEDURE — 93280 PM DEVICE PROGR EVAL DUAL: CPT

## 2024-03-15 PROCEDURE — RXMED WILLOW AMBULATORY MEDICATION CHARGE

## 2024-03-15 PROCEDURE — 93280 PM DEVICE PROGR EVAL DUAL: CPT | Performed by: INTERNAL MEDICINE

## 2024-03-15 RX ORDER — CLOPIDOGREL BISULFATE 75 MG/1
75 TABLET ORAL DAILY
Qty: 90 TABLET | Refills: 3 | Status: SHIPPED | OUTPATIENT
Start: 2024-03-15 | End: 2025-03-15

## 2024-03-15 RX ORDER — METOPROLOL TARTRATE 25 MG/1
25 TABLET, FILM COATED ORAL 2 TIMES DAILY
Qty: 180 TABLET | Refills: 3 | Status: SHIPPED | OUTPATIENT
Start: 2024-03-15 | End: 2025-03-15

## 2024-03-15 RX ORDER — SIMVASTATIN 20 MG/1
20 TABLET, FILM COATED ORAL NIGHTLY
Qty: 90 TABLET | Refills: 3 | Status: SHIPPED | OUTPATIENT
Start: 2024-03-15 | End: 2025-03-15

## 2024-03-15 NOTE — PROGRESS NOTES
"CARDIOLOGY OFFICE VISIT      CHIEF COMPLAINT  Chief Complaint   Patient presents with    Device Check     Chief complaint: \"I had the watchman put in.\"  HISTORY OF PRESENT ILLNESS  HPI  History: The patient is a 59-year-old  female who is followed for sinus node dysfunction status post dual-chamber pacemaker implant on October 22, 2013, paroxysmal atrial fibrillation controlled on beta-blockade, remote slow pathway ablation for the treatment of AV node reentry tachycardia, nonsustained ventricular tachycardia per device interrogation and remote CVA at age 17 with transient hemiparesis and TIA with right-sided weakness and falls.  She underwent implantation of a number 28 mm amulet left atrial closure device on March 8, 2024.  She did experience some bleeding from the right groin which was resolved with application of a pressure dressing.  She states she has had no additional problems from that site.  She denies chest pain, palpitations, dizziness, lightheadedness, shortness of breath, abdominal distention, or lower extremity edema.  Past Medical History  Past Medical History:   Diagnosis Date    Dorsalgia, unspecified     Disabling back pain    Encounter for follow-up examination after completed treatment for conditions other than malignant neoplasm 12/14/2021    Encounter for examination following treatment at hospital    Encounter for other administrative examinations 12/14/2021    Encounter for completion of form with patient    Epigastric abdominal tenderness 12/14/2021    Epigastric abdominal tenderness    Fever, unspecified 12/10/2021    Low grade fever    Other conditions influencing health status 12/10/2021    History of cough    Other disturbances of smell and taste 12/10/2021    COVID-19 long hauler manifesting chronic loss of smell and taste    Personal history of other diseases of the circulatory system 03/21/2017    History of essential hypertension    Personal history of other diseases of the " circulatory system 03/21/2017    History of atrial fibrillation    Personal history of other diseases of the circulatory system     History of paroxysmal supraventricular tachycardia    Personal history of other diseases of the digestive system 03/21/2017    History of esophageal reflux    Personal history of other diseases of the digestive system 01/19/2022    History of chronic constipation    Personal history of other diseases of the digestive system     History of esophageal spasm    Personal history of other diseases of the digestive system     History of gastroesophageal reflux (GERD)    Personal history of other diseases of the musculoskeletal system and connective tissue 12/20/2017    History of low back pain    Personal history of other endocrine, nutritional and metabolic disease 03/21/2017    History of hyperlipidemia    Personal history of other medical treatment     History of screening mammography    Personal history of other mental and behavioral disorders 03/21/2017    History of depression    Personal history of other mental and behavioral disorders     History of depression    Personal history of other specified conditions 01/20/2022    History of diarrhea    Personal history of other specified conditions 01/20/2022    History of nausea and vomiting    Personal history of other specified conditions 01/20/2022    History of epigastric pain    Personal history of other specified conditions 07/15/2021    History of dysphagia    Personal history of other specified conditions 12/14/2021    History of abdominal pain    Personal history of other specified conditions     History of chest pain    Personal history of other specified conditions     History of syncope    Personal history of other specified conditions     History of vomiting    Personal history of other specified conditions 12/04/2019    History of urinary frequency    Personal history of urinary (tract) infections 12/26/2019    History of  urinary tract infection    Shortness of breath     SOBOE (shortness of breath on exertion)    Urge incontinence 11/13/2020    Urge incontinence       Social History  Social History     Tobacco Use    Smoking status: Former     Types: Cigarettes    Smokeless tobacco: Never   Substance Use Topics    Alcohol use: Not Currently     Comment: rarely    Drug use: Never       Family History     Family History   Problem Relation Name Age of Onset    Leukemia Mother      Coronary artery disease Father          Allergies:  Allergies   Allergen Reactions    Prochlorperazine Nausea/vomiting, Other and Rash     vomiting        Outpatient Medications:  Current Outpatient Medications   Medication Instructions    amitriptyline (ELAVIL) 10 mg, oral, Nightly PRN    aspirin 81 mg, oral, Daily    clopidogrel (PLAVIX) 75 mg, oral, Daily    cyclobenzaprine (FLEXERIL) 5 mg, oral, Daily PRN    melatonin 10 mg tablet,ext release multiphase 1 tablet, oral, Nightly    methvinny-m.blue-sAntoniophos-phsal-hyo (UribeL) 118-10-40.8-36 mg capsule 1 capsule, oral, Every 8 hours PRN    metoprolol tartrate (Lopressor) 25 mg tablet TAKE 1 TABLET BY MOUTH TWO TIMES A DAY    omeprazole (PRILOSEC) 20 mg, oral, Daily    oxybutynin XL (Ditropan-XL) 15 mg 24 hr tablet TAKE 2 TABLETS BY MOUTH ONCE DAILY    rOPINIRole (REQUIP) 0.25 mg, oral, Nightly PRN    sertraline (ZOLOFT) 100 mg, oral, Daily    simvastatin (Zocor) 20 mg tablet TAKE 1 TABLET BY MOUTH EVERY NIGHT AT BEDTIME    sucralfate (CARAFATE) 1 g, oral, Daily PRN          REVIEW OF SYSTEMS  Review of Systems   All other systems reviewed and are negative.        VITALS  Vitals:    03/15/24 0758   BP: 118/64   Pulse: 86       PHYSICAL EXAM  Vitals and nursing note reviewed.   Constitutional:       Appearance: Normal appearance.   HENT:      Head: Normocephalic.   Neck:      Vascular: No JVD.   Cardiovascular:      Rate and Rhythm: Normal rate and regular rhythm.      Pulses: Normal pulses.      Heart sounds:  Normal heart sounds.   Pulmonary:      Effort: Pulmonary effort is normal.      Breath sounds: Normal breath sounds.   Abdominal:      General: Bowel sounds are normal.      Palpations: Abdomen is soft.   Musculoskeletal:         General: Normal range of motion.      Cervical back: Normal range of motion.   Skin:     General: Skin is warm and dry.  Left subclavian pacemaker pocket is well-healed without redness swelling or drainage.  Neurological:      General: No focal deficit present.      Mental Status: She is alert and oriented to person, place, and time.      Motor: Motor function is intact.   Psychiatric:         Attention and Perception: Attention and perception normal.         Mood and Affect: Mood and affect normal.         Speech: Speech normal.         Behavior: Behavior normal. Behavior is cooperative.         Thought Content: Thought content normal.         Cognition and Memory: Cognition and memory normal.        Labs and testing: Twelve-lead EKG reveals atrial and ventricular pacing at 86 bpm.  Prolonged AV conduction with OR interval of 288 ms, QRS duration 122 ms,  ms, QTc 457 ms.  Pacemaker interrogation dated March 15, 2024 reveals atrial pacing 82.5% and ventricular pacing 15%.  No atrial or ventricular arrhythmic events were noted.  Rate response was adjusted due to patient complaints of generalized fatigue.  Good sensing and capture thresholds, estimated battery longevity is 2 years.  2D echocardiogram dated March 8, 2024 revealed an ejection fraction of 55 to 60% with mild left atrial enlargement.    ASSESSMENT AND PLAN       Clinical impressions:  1. Sinus node dysfunction status post dual-chamber pacemaker implant ([employer] advisor DR NICHOLS) on October 22, 2013.  2. Explantation of a [employer] reveal Linq loop recorder.  3. Paroxysmal atrial fibrillation controlled on beta-blockade and presently not anticoagulated due to low arrhythmic burden.  4. Remote slow pathway ablation for the  treatment of AV node reentry tachycardia.  5. Nonsustained ventricular tachycardia per device interrogation today on beta-blockade.  6. Remote CVA at age 17 with transient hemiparesis and no ongoing neurological deficits.  TIA with right-sided weakness and falls.  7. Interstitial cystitis.  8. Class I obesity with a BMI 29.41.  9.  #28mm  Amulet left atrial appendage closure device placement on March 8, 2024.    Recommendations:  1.  Continue current medications as prescribed.  2.  The April 1, 2024 office visit with myself will be canceled.  3.  Follow-up in office with Dr. Zuniga in 6 months or sooner if needed.  4.  Obtain a remote device interrogation on June 17, 2024 and in clinic check prior to the office visit with Dr. Zuniga.  5.  Instructions regarding medications post amulet left atrial appendage closure device were reviewed with the patient.  All questions were answered.  6.  Continue lifestyle modifications.    Evaluation and note by Rosi Grant, CNP  **Please excuse any errors in grammar or translation related to this dictation.  Voice recognition software was utilized to prepare this document.**

## 2024-03-25 LAB
ATRIAL RATE: 68 BPM
P AXIS: -4 DEGREES
P OFFSET: 199 MS
P ONSET: 172 MS
PR INTERVAL: 210 MS
Q ONSET: 222 MS
QRS COUNT: 11 BEATS
QRS DURATION: 96 MS
QT INTERVAL: 394 MS
QTC CALCULATION(BAZETT): 418 MS
QTC FREDERICIA: 411 MS
R AXIS: 2 DEGREES
T AXIS: 0 DEGREES
T OFFSET: 419 MS
VENTRICULAR RATE: 68 BPM

## 2024-04-01 ENCOUNTER — APPOINTMENT (OUTPATIENT)
Dept: CARDIOLOGY | Facility: CLINIC | Age: 59
End: 2024-04-01
Payer: COMMERCIAL

## 2024-04-08 ENCOUNTER — APPOINTMENT (OUTPATIENT)
Dept: UROLOGY | Facility: CLINIC | Age: 59
End: 2024-04-08
Payer: COMMERCIAL

## 2024-04-09 ENCOUNTER — PATIENT OUTREACH (OUTPATIENT)
Dept: CARE COORDINATION | Facility: CLINIC | Age: 59
End: 2024-04-09
Payer: COMMERCIAL

## 2024-04-09 NOTE — PROGRESS NOTES
[facility] 30 day follow up:  Chart reviewed, call placed and VMM  left.  Will graduate from the [facility] program

## 2024-04-26 ENCOUNTER — APPOINTMENT (OUTPATIENT)
Dept: UROLOGY | Facility: CLINIC | Age: 59
End: 2024-04-26
Payer: COMMERCIAL

## 2024-05-23 ENCOUNTER — APPOINTMENT (OUTPATIENT)
Dept: UROLOGY | Facility: CLINIC | Age: 59
End: 2024-05-23
Payer: COMMERCIAL

## 2024-06-03 ENCOUNTER — OFFICE VISIT (OUTPATIENT)
Dept: GASTROENTEROLOGY | Facility: CLINIC | Age: 59
End: 2024-06-03
Payer: COMMERCIAL

## 2024-06-03 ENCOUNTER — TELEMEDICINE (OUTPATIENT)
Dept: UROLOGY | Facility: CLINIC | Age: 59
End: 2024-06-03
Payer: COMMERCIAL

## 2024-06-03 VITALS
HEIGHT: 70 IN | BODY MASS INDEX: 29.63 KG/M2 | WEIGHT: 207 LBS | HEART RATE: 67 BPM | SYSTOLIC BLOOD PRESSURE: 122 MMHG | OXYGEN SATURATION: 95 % | DIASTOLIC BLOOD PRESSURE: 87 MMHG

## 2024-06-03 DIAGNOSIS — R10.13 EPIGASTRIC PAIN: ICD-10-CM

## 2024-06-03 DIAGNOSIS — N30.10 INTERSTITIAL CYSTITIS: ICD-10-CM

## 2024-06-03 DIAGNOSIS — K52.9 CHRONIC DIARRHEA: ICD-10-CM

## 2024-06-03 DIAGNOSIS — N32.81 OAB (OVERACTIVE BLADDER): Primary | ICD-10-CM

## 2024-06-03 DIAGNOSIS — K21.9 GASTROESOPHAGEAL REFLUX DISEASE WITHOUT ESOPHAGITIS: Primary | ICD-10-CM

## 2024-06-03 PROCEDURE — 99214 OFFICE O/P EST MOD 30 MIN: CPT | Performed by: NURSE PRACTITIONER

## 2024-06-03 PROCEDURE — 1036F TOBACCO NON-USER: CPT | Performed by: NURSE PRACTITIONER

## 2024-06-03 PROCEDURE — 99213 OFFICE O/P EST LOW 20 MIN: CPT | Performed by: NURSE PRACTITIONER

## 2024-06-03 PROCEDURE — RXMED WILLOW AMBULATORY MEDICATION CHARGE

## 2024-06-03 RX ORDER — PANTOPRAZOLE SODIUM 40 MG/1
40 TABLET, DELAYED RELEASE ORAL
Qty: 30 TABLET | Refills: 2 | Status: SHIPPED | OUTPATIENT
Start: 2024-06-03 | End: 2025-06-03

## 2024-06-03 RX ORDER — OXYBUTYNIN CHLORIDE 10 MG/1
20 TABLET, EXTENDED RELEASE ORAL DAILY
Qty: 180 TABLET | Refills: 1 | Status: SHIPPED | OUTPATIENT
Start: 2024-06-03 | End: 2024-11-30

## 2024-06-03 NOTE — PROGRESS NOTES
"Subjective   Patient ID: Yarelis Brantley \"Wellington" is a 59 y.o. female who presents for Urinary Frequency.  Presents for f/u of interstitial cystitis. She takes oxybutynin XL 15 mg daily. She occasionally has flares of her symptoms for which she takes Uribel. She denies any fever, chills, or dysuria.      Patient with most recent flare of IC in Dec 2019. Treated with 2 rounds of installation therapy. She takes initially her diagnosis was confirmed on cysto 2015. She underwent cysto with Dr. Llanos in Dec 2019 which did not identify any bladder abnormalities.      Developing some recurrence of dysuria and increase in urgency over the last few months.           Review of Systems   All other systems reviewed and are negative.      Objective   Physical Exam  Alert and oriented x3  Breathes easily on room air  Appears stated age, no acute distress    Assessment/Plan   Diagnoses and all orders for this visit:  OAB (overactive bladder)  -     oxybutynin XL (Ditropan XL) 10 mg 24 hr tablet; Take 2 tablets (20 mg) by mouth once daily. Do not crush, chew, or split.  Interstitial cystitis    Plan to trial increase in oxybutynin to 20mg. If this is inadequate or intolerable we will consider switching medications. Patient will let me know in a few weeks.        RANJIT Mandel-CNP 06/03/24 4:35 PM   "

## 2024-06-03 NOTE — PROGRESS NOTES
"Assessment/Plan   Yarelis Brantley \"Thu\" is a 59 y.o. female with PMH significant for atrial fibrillation with recent Watchman procedure (on DAPT), cardiac pacemaker insertion, anxiety, GERD, HLD, restless leg syndrome and TIA who presents to GI clinic for episodes of epigastric pain associated with nausea, vomiting, diarrhea and sweating that happens at unpredictable time, not always correlated with food - can happen in the middle of the night or after eating and resolves within a few hours.  Also reports chronic diarrhea, >3 BMs daily.    GERD versus gastritis versus PUD  Current use of DAPT  Chronic diarrhea    Recommend EGD to be done at the hospital  Patient informed she will need a  the day of the procedure  Start pantoprazole 40 mg p.o. twice daily, take 30 minutes before meals.  Avoid NSAIDs  Antireflux lifestyle  Check stool pathogen PCR, fecal calprotectin, sed rate, CRP, celiac serology and TSH  Follow-up in GI clinic 2 weeks after EGD to review results    Mavis Mosqueda, APRN-CNP    Subjective     History of Present Illness:   Yarelis Brantley \"Thu\" is a 59 y.o. female with PMH significant for recent Watchman procedure (on DAPT), cardiac pacemaker insertion, anxiety, GERD, HLD, restless leg syndrome and TIA who presents to GI clinic for constellation of GI complaints.  Patient reports having episodes of epigastric pain associated with nausea, vomiting, diarrhea and sweating that happens at unpredictable time, not always correlated with food - can happen in the middle of the night or after eating and resolves within a few hours.  Patient has at least 1 or 2 episodes a week.  She also has lower abdominal cramping, discomfort with diarrhea on occasion.  No black or bloody stools.  Patient was seen last year for similar presentation and was started on pantoprazole 40 twice daily and Carafate 1 g twice daily and asked to have EGD done patient did not follow through with EGD and at some point stopped " taking PPI and Carafate.  Denies routine NSAID use.    LOV 2/6/2023  Patient is a 57-year-old female with past medical history of anxiety, cardiac pacemaker, chronic acid reflux, hyperlipidemia, restless leg syndrome, and history of TIA who presents for epigastric abdominal pain. She was recently admitted to the hospital for weakness and strokelike symptoms. Initial work-up was unremarkable. Since discharge January 17 she has had continued epigastric abdominal pain that is worse with eating and does not find anything helps. She describes it as a constant pain. She has been trying to tolerate a bland diet but is only able to eat small amounts. She stopped drinking coffee and tea. She has been taking her pantoprazole 40 mg 30 minutes before breakfast. She endorses some nausea and vomiting without hematemesis. She denies dysphagia or odynophagia. She denies constipation, diarrhea, melena, or hematochezia.     Last colonoscopy 2021 unremarkable  Last colonoscopy 2017 due in 10 years     EGD 1/7/2023       - Normal mucosa was found in the entire esophagus. Biopsied.  Pathology showed squamous esophageal mucosa with no significant pathologic change.  No intraepithelial eosinophils or intestinal metaplasia.  - Widely patent, non-obstructing and moderate Schatzki ring. Dilated.  - Z-line, 40 cm from the incisors.  - Erythematous mucosa in the stomach. Biopsied.  Pathology showed gastric antral and oxyntic type mucosa with mild reactive gastropathy.  No H. pylori.  - Normal examined duodenum.    Review of Systems  ROS Negative unless otherwise stated above.    Past Medical History   has a past medical history of Dorsalgia, unspecified, Encounter for follow-up examination after completed treatment for conditions other than malignant neoplasm (12/14/2021), Encounter for other administrative examinations (12/14/2021), Epigastric abdominal tenderness (12/14/2021), Fever, unspecified (12/10/2021), Other conditions influencing  health status (12/10/2021), Other disturbances of smell and taste (12/10/2021), Personal history of other diseases of the circulatory system (03/21/2017), Personal history of other diseases of the circulatory system (03/21/2017), Personal history of other diseases of the circulatory system, Personal history of other diseases of the digestive system (03/21/2017), Personal history of other diseases of the digestive system (01/19/2022), Personal history of other diseases of the digestive system, Personal history of other diseases of the digestive system, Personal history of other diseases of the musculoskeletal system and connective tissue (12/20/2017), Personal history of other endocrine, nutritional and metabolic disease (03/21/2017), Personal history of other medical treatment, Personal history of other mental and behavioral disorders (03/21/2017), Personal history of other mental and behavioral disorders, Personal history of other specified conditions (01/20/2022), Personal history of other specified conditions (01/20/2022), Personal history of other specified conditions (01/20/2022), Personal history of other specified conditions (07/15/2021), Personal history of other specified conditions (12/14/2021), Personal history of other specified conditions, Personal history of other specified conditions, Personal history of other specified conditions, Personal history of other specified conditions (12/04/2019), Personal history of urinary (tract) infections (12/26/2019), Shortness of breath, and Urge incontinence (11/13/2020).     Social History   reports that she has quit smoking. Her smoking use included cigarettes. She has never used smokeless tobacco. She reports that she does not currently use alcohol. She reports that she does not use drugs.     Family History  family history includes Coronary artery disease in her father; Leukemia in her mother.     Allergies  Allergies   Allergen Reactions    Prochlorperazine  Nausea/vomiting, Other and Rash     vomiting       Medications  Current Outpatient Medications   Medication Instructions    amitriptyline (ELAVIL) 10 mg, oral, Nightly PRN    aspirin 81 mg, oral, Daily    clopidogrel (PLAVIX) 75 mg, oral, Daily    clopidogrel (PLAVIX) 75 mg, oral, Daily    cyclobenzaprine (FLEXERIL) 5 mg, oral, Daily PRN    melatonin 10 mg tablet,ext release multiphase 1 tablet, oral, Nightly    methen-mAntonioblue-s.phos-phsal-hyo (UribeL) 118-10-40.8-36 mg capsule 1 capsule, oral, Every 8 hours PRN    metoprolol tartrate (Lopressor) 25 mg tablet TAKE 1 TABLET BY MOUTH TWO TIMES A DAY    omeprazole (PRILOSEC) 20 mg, oral, Daily    rOPINIRole (REQUIP) 0.25 mg, oral, Nightly PRN    sertraline (ZOLOFT) 100 mg, oral, Daily    simvastatin (Zocor) 20 mg tablet TAKE 1 TABLET BY MOUTH EVERY NIGHT AT BEDTIME    sucralfate (CARAFATE) 1 g, oral, Daily PRN        Objective   Visit Vitals  /87   Pulse 67     General: A&Ox3, NAD.  HEENT: AT/NC.   CV: RRR. No murmur.  Resp: CTA bilaterally. No wheezing, rhonchi or rales.   GI: Soft, NT/ND. BSx4.  Extrem: No edema. Pulses intact.  Skin: No Jaundice.   Neuro: No focal deficits.   Psych: Normal mood and affect.     Lab Results   Component Value Date    WBC 7.8 03/09/2024    WBC 6.5 02/27/2024    WBC 5.6 01/05/2024    HGB 14.0 03/09/2024    HGB 13.9 02/27/2024    HGB 13.2 01/05/2024    HCT 42.0 03/09/2024    HCT 43.1 02/27/2024    HCT 40.5 01/05/2024     03/09/2024     02/27/2024     01/05/2024     Lab Results   Component Value Date     03/09/2024    K 4.2 03/09/2024     03/09/2024    CO2 25 03/09/2024    BUN 12 03/09/2024    CREATININE 0.80 03/09/2024    GLUCOSE 105 (H) 03/09/2024    CALCIUM 9.6 03/09/2024       Lab Results   Component Value Date    ALKPHOS 79 03/09/2024    ALKPHOS 96 01/15/2023    ALKPHOS 80 09/14/2022    BILITOT 0.7 03/09/2024    BILITOT 0.8 01/15/2023    BILITOT 0.6 09/14/2022    BILIDIR 0.1 10/10/2020    PROT 7.2  03/09/2024    PROT 7.4 01/15/2023    PROT 6.6 09/14/2022    ALT 16 03/09/2024    ALT 37 01/15/2023    ALT 16 09/14/2022    AST 21 03/09/2024    AST 38 01/15/2023    AST 17 09/14/2022      Lab Results   Component Value Date    INR 1.1 01/15/2023

## 2024-06-04 ENCOUNTER — LAB (OUTPATIENT)
Dept: LAB | Facility: LAB | Age: 59
End: 2024-06-04
Payer: COMMERCIAL

## 2024-06-04 DIAGNOSIS — K21.9 GASTROESOPHAGEAL REFLUX DISEASE WITHOUT ESOPHAGITIS: ICD-10-CM

## 2024-06-04 DIAGNOSIS — K52.9 CHRONIC DIARRHEA: ICD-10-CM

## 2024-06-04 LAB
CRP SERPL-MCNC: <0.1 MG/DL
ERYTHROCYTE [SEDIMENTATION RATE] IN BLOOD BY WESTERGREN METHOD: 5 MM/H (ref 0–30)
TSH SERPL-ACNC: 1.4 MIU/L (ref 0.44–3.98)

## 2024-06-04 PROCEDURE — 86140 C-REACTIVE PROTEIN: CPT

## 2024-06-04 PROCEDURE — 83516 IMMUNOASSAY NONANTIBODY: CPT

## 2024-06-04 PROCEDURE — 36415 COLL VENOUS BLD VENIPUNCTURE: CPT

## 2024-06-04 PROCEDURE — 85652 RBC SED RATE AUTOMATED: CPT

## 2024-06-04 PROCEDURE — 84443 ASSAY THYROID STIM HORMONE: CPT

## 2024-06-05 LAB
GLIADIN PEPTIDE IGA SER IA-ACNC: 2.5 U/ML
TTG IGA SER IA-ACNC: <1 U/ML

## 2024-06-06 ENCOUNTER — PHARMACY VISIT (OUTPATIENT)
Dept: PHARMACY | Facility: CLINIC | Age: 59
End: 2024-06-06
Payer: COMMERCIAL

## 2024-06-06 ENCOUNTER — APPOINTMENT (OUTPATIENT)
Dept: UROLOGY | Facility: CLINIC | Age: 59
End: 2024-06-06
Payer: COMMERCIAL

## 2024-06-06 LAB
GLIADIN PEPTIDE IGG SER IA-ACNC: <0.56 FLU (ref 0–4.99)
TTG IGG SER IA-ACNC: <0.82 FLU (ref 0–4.99)

## 2024-06-07 ENCOUNTER — TELEPHONE (OUTPATIENT)
Dept: GASTROENTEROLOGY | Facility: CLINIC | Age: 59
End: 2024-06-07
Payer: COMMERCIAL

## 2024-06-26 ENCOUNTER — LAB (OUTPATIENT)
Dept: LAB | Facility: LAB | Age: 59
End: 2024-06-26
Payer: COMMERCIAL

## 2024-06-26 DIAGNOSIS — I48.91 ATRIAL FIBRILLATION, UNSPECIFIED TYPE (MULTI): ICD-10-CM

## 2024-06-26 LAB
ALBUMIN SERPL BCP-MCNC: 4 G/DL (ref 3.4–5)
ANION GAP SERPL CALC-SCNC: 12 MMOL/L (ref 10–20)
BUN SERPL-MCNC: 15 MG/DL (ref 6–23)
CALCIUM SERPL-MCNC: 9 MG/DL (ref 8.6–10.3)
CHLORIDE SERPL-SCNC: 107 MMOL/L (ref 98–107)
CO2 SERPL-SCNC: 25 MMOL/L (ref 21–32)
CREAT SERPL-MCNC: 0.86 MG/DL (ref 0.5–1.05)
EGFRCR SERPLBLD CKD-EPI 2021: 77 ML/MIN/1.73M*2
GLUCOSE SERPL-MCNC: 119 MG/DL (ref 74–99)
PHOSPHATE SERPL-MCNC: 3.6 MG/DL (ref 2.5–4.9)
POTASSIUM SERPL-SCNC: 4.3 MMOL/L (ref 3.5–5.3)
SODIUM SERPL-SCNC: 140 MMOL/L (ref 136–145)

## 2024-06-26 PROCEDURE — 36415 COLL VENOUS BLD VENIPUNCTURE: CPT

## 2024-06-26 PROCEDURE — 80069 RENAL FUNCTION PANEL: CPT

## 2024-06-29 PROCEDURE — RXMED WILLOW AMBULATORY MEDICATION CHARGE

## 2024-06-29 ASSESSMENT — ENCOUNTER SYMPTOMS
HEMATURIA: 0
MYALGIAS: 0
FEVER: 0
HEADACHES: 0
NAUSEA: 1
WEIGHT LOSS: 0
FLATUS: 0
HEMATOCHEZIA: 0
VOMITING: 1
DIARRHEA: 1
ABDOMINAL PAIN: 1
CONSTIPATION: 0
ARTHRALGIAS: 0
BELCHING: 0
ANOREXIA: 0
FREQUENCY: 0
DYSURIA: 0

## 2024-07-01 ENCOUNTER — APPOINTMENT (OUTPATIENT)
Dept: PRIMARY CARE | Facility: CLINIC | Age: 59
End: 2024-07-01
Payer: COMMERCIAL

## 2024-07-01 VITALS
BODY MASS INDEX: 29.55 KG/M2 | DIASTOLIC BLOOD PRESSURE: 70 MMHG | HEIGHT: 70 IN | SYSTOLIC BLOOD PRESSURE: 114 MMHG | OXYGEN SATURATION: 96 % | HEART RATE: 85 BPM | WEIGHT: 206.4 LBS | TEMPERATURE: 97.6 F

## 2024-07-01 DIAGNOSIS — R11.2 NAUSEA AND VOMITING, UNSPECIFIED VOMITING TYPE: Primary | ICD-10-CM

## 2024-07-01 DIAGNOSIS — R73.01 IFG (IMPAIRED FASTING GLUCOSE): ICD-10-CM

## 2024-07-01 DIAGNOSIS — R19.7 DIARRHEA, UNSPECIFIED TYPE: ICD-10-CM

## 2024-07-01 DIAGNOSIS — Z02.89 ENCOUNTER FOR COMPLETION OF FORM WITH PATIENT: ICD-10-CM

## 2024-07-01 LAB — POC HEMOGLOBIN A1C: 5.4 % (ref 4.2–6.5)

## 2024-07-01 PROCEDURE — RXMED WILLOW AMBULATORY MEDICATION CHARGE

## 2024-07-01 PROCEDURE — 83036 HEMOGLOBIN GLYCOSYLATED A1C: CPT | Performed by: FAMILY MEDICINE

## 2024-07-01 PROCEDURE — 1036F TOBACCO NON-USER: CPT | Performed by: FAMILY MEDICINE

## 2024-07-01 PROCEDURE — 99214 OFFICE O/P EST MOD 30 MIN: CPT | Performed by: FAMILY MEDICINE

## 2024-07-01 RX ORDER — PANTOPRAZOLE SODIUM 40 MG/1
40 TABLET, DELAYED RELEASE ORAL 2 TIMES DAILY
Qty: 60 TABLET | Refills: 1 | Status: SHIPPED | OUTPATIENT
Start: 2024-07-01 | End: 2024-08-30

## 2024-07-01 ASSESSMENT — ENCOUNTER SYMPTOMS
ANOREXIA: 0
FLATUS: 0
ARTHRALGIAS: 0
ABDOMINAL PAIN: 1
WEIGHT LOSS: 0
VOMITING: 1
HEMATOCHEZIA: 0
DYSURIA: 0
CONSTIPATION: 0
HEADACHES: 0
NAUSEA: 1
HEMATURIA: 0
MYALGIAS: 0
FEVER: 0
DIARRHEA: 1
BELCHING: 0
FREQUENCY: 0

## 2024-07-01 ASSESSMENT — PATIENT HEALTH QUESTIONNAIRE - PHQ9
SUM OF ALL RESPONSES TO PHQ9 QUESTIONS 1 AND 2: 0
2. FEELING DOWN, DEPRESSED OR HOPELESS: NOT AT ALL
1. LITTLE INTEREST OR PLEASURE IN DOING THINGS: NOT AT ALL

## 2024-07-01 NOTE — PROGRESS NOTES
"Subjective   Chief complaint: Yarelis Brantley \"Thu\" is a 59 y.o. female who presents for Nausea (Patient is in office today for intermittent nausea and diarrhea vomiting along with sweating. Patient advises that it comes and goes for about a year. Patient advises that its gotten worse. Patient advises that she is seeing GI and she's going to do an endoscopy. However the Gi doctor told her to see her PCP for potential FMLA. ).    HPI:  Here today with a complaint of intermittent nausea and vomiting.  Stomach often times feels unsettled. She reports having \"burning\" in her chest \"all the time.\"  She takes Protonix daily.  Has been seen by GI nursing.  Recently had limited blood work done ordered by her specialist.  Her thyroid lab was normal.  Had elevated glucose levels.  No recent HgbA1c.  Her GI nurse, Jaylin, referred her back to primary care.    The episodes of nausea, vomiting and subsequent loose stools can occur abruptly, unexpectedly and cause her to have diaphoresis and discomfort.  Episodes last a few hours and causes her to be unable to carry out her usual work duties.  She is requesting FMLA paperwork to be completed today for her employer.  She has been taking a proton pump inhibitor at the higher dose which has not been helpful.  Ends up taking Mylanta for several doses during the day when she has these episodes.        Abdominal Pain  This is a recurrent problem. The current episode started more than 1 month ago. The onset quality is sudden. The problem occurs daily. The problem has been rapidly worsening. The pain is located in the epigastric region. The pain is at a severity of 7/10. The quality of the pain is dull. The abdominal pain radiates to the chest. Associated symptoms include diarrhea, nausea and vomiting. Pertinent negatives include no anorexia, arthralgias, belching, constipation, dysuria, fever, flatus, frequency, headaches, hematochezia, hematuria, melena, myalgias or weight loss. " "Nothing aggravates the pain. The pain is relieved by Nothing. Prior diagnostic workup includes GI consult.       Objective   /70 (BP Location: Right arm, Patient Position: Sitting, BP Cuff Size: Large adult)   Pulse 85   Temp 36.4 °C (97.6 °F) (Temporal)   Ht 1.778 m (5' 10\")   Wt 93.6 kg (206 lb 6.4 oz)   SpO2 96% Comment: RA  BMI 29.62 kg/m²   Physical Exam  General:  Alert, oriented, no acute distress  Eyes:  Sclerae white, PER, conjunctivae clear  ENT:  No nasal congestion.    Neck: Supple  Endocrine:  HgbA1c today in office is 5.4%  Respiratory:  Normal breath sounds.  No wheezing, rhonchi nor crackles.  No dyspnea.  Cardiovascular:  S1 and S2 positive.  Regular rate and rhythm.  No gallops.  No murmurs.  Vascular:  No edema.  Skin warm and dry.  CNS:  No gross neurological deficits.  Gait within normal limits.    Psychiatric:  Affect is positive and appropriate.  No depression.  No anxiety.    Review of Systems   Constitutional:  Negative for fever and weight loss.   Gastrointestinal:  Positive for abdominal pain, diarrhea, nausea and vomiting. Negative for anorexia, constipation, flatus, hematochezia and melena.   Genitourinary:  Negative for dysuria, frequency and hematuria.   Musculoskeletal:  Negative for arthralgias and myalgias.   Neurological:  Negative for headaches.      I have reviewed and reconciled the medication list with the patient today.   Current Outpatient Medications:     amitriptyline (Elavil) 10 mg tablet, Take 1 tablet (10 mg) by mouth as needed at bedtime for sleep., Disp: , Rfl:     aspirin 81 mg EC tablet, Take 1 tablet (81 mg) by mouth once daily., Disp: , Rfl:     cyclobenzaprine (Flexeril) 5 mg tablet, Take 1 tablet (5 mg) by mouth once daily as needed., Disp: , Rfl:     melatonin 10 mg tablet,ext release multiphase, Take 1 tablet by mouth once daily at bedtime., Disp: , Rfl:     methen-m.blue-s.phos-phsal-hyo (UribeL) 118-10-40.8-36 mg capsule, Take 1 capsule by mouth " every 8 hours if needed., Disp: , Rfl:     metoprolol tartrate (Lopressor) 25 mg tablet, TAKE 1 TABLET BY MOUTH TWO TIMES A DAY, Disp: 180 tablet, Rfl: 3    oxybutynin XL (Ditropan XL) 10 mg 24 hr tablet, Take 2 tablets (20 mg) by mouth once daily. Do not crush, chew, or split., Disp: 180 tablet, Rfl: 1    rOPINIRole (Requip) 0.25 mg tablet, Take 1 tablet (0.25 mg) by mouth as needed at bedtime., Disp: , Rfl:     sertraline (Zoloft) 100 mg tablet, Take 1 tablet (100 mg) by mouth once daily., Disp: , Rfl:     simvastatin (Zocor) 20 mg tablet, TAKE 1 TABLET BY MOUTH EVERY NIGHT AT BEDTIME, Disp: 90 tablet, Rfl: 3    sucralfate (Carafate) 1 gram tablet, Take 1 tablet (1 g) by mouth once daily as needed., Disp: , Rfl:     clopidogrel (Plavix) 75 mg tablet, Take 1 tablet (75 mg) by mouth once daily., Disp: 180 tablet, Rfl: 0    pantoprazole (ProtoNix) 40 mg EC tablet, Take 1 tablet (40 mg) by mouth 2 times a day. Do not crush, chew, or split., Disp: 60 tablet, Rfl: 1     Imaging:  No results found.     Labs reviewed:    Lab Results   Component Value Date    WBC 7.8 03/09/2024    HGB 14.0 03/09/2024    HCT 42.0 03/09/2024     03/09/2024    CHOL 189 01/16/2023    TRIG 99 01/16/2023    HDL 47.4 01/16/2023    ALT 16 03/09/2024    AST 21 03/09/2024     06/26/2024    K 4.3 06/26/2024     06/26/2024    CREATININE 0.86 06/26/2024    BUN 15 06/26/2024    CO2 25 06/26/2024    TSH 1.40 06/04/2024    INR 1.1 01/15/2023    HGBA1C 5.4 07/01/2024       Assessment/Plan   Problem List Items Addressed This Visit       Diarrhea     Frequently associated with her nausea.    Patient to have an upper endoscopy August 2, 2024.  If ulcer and possible bleeding, may cause the loose stools.           Encounter for completion of form with patient     FMLA paperwork completed. Copy scanned on chart.           IFG (impaired fasting glucose)     Today's A1c is normal at 5.4%.           Relevant Orders    POCT glycosylated hemoglobin  (Hb A1C) manually resulted (Completed)    Nausea and vomiting - Primary     Currently fine.           Relevant Medications    pantoprazole (ProtoNix) 40 mg EC tablet       Continue other medications as listed  Follow up in 3 months or sooner pending endoscopy results or other concerns.

## 2024-07-01 NOTE — ASSESSMENT & PLAN NOTE
Frequently associated with her nausea.    Patient to have an upper endoscopy August 2, 2024.  If ulcer and possible bleeding, may cause the loose stools.

## 2024-07-02 ENCOUNTER — HOSPITAL ENCOUNTER (OUTPATIENT)
Dept: RADIOLOGY | Facility: CLINIC | Age: 59
Discharge: HOME | End: 2024-07-02
Payer: COMMERCIAL

## 2024-07-02 DIAGNOSIS — I48.91 ATRIAL FIBRILLATION, UNSPECIFIED TYPE (MULTI): ICD-10-CM

## 2024-07-02 PROCEDURE — 2550000001 HC RX 255 CONTRASTS: Performed by: INTERNAL MEDICINE

## 2024-07-02 PROCEDURE — 75572 CT HRT W/3D IMAGE: CPT

## 2024-07-05 ENCOUNTER — PHARMACY VISIT (OUTPATIENT)
Dept: PHARMACY | Facility: CLINIC | Age: 59
End: 2024-07-05
Payer: COMMERCIAL

## 2024-07-05 ENCOUNTER — HOSPITAL ENCOUNTER (OUTPATIENT)
Dept: CARDIOLOGY | Facility: HOSPITAL | Age: 59
Discharge: HOME | End: 2024-07-05
Payer: COMMERCIAL

## 2024-07-05 DIAGNOSIS — I49.5 SINOATRIAL NODE DYSFUNCTION (MULTI): ICD-10-CM

## 2024-07-05 DIAGNOSIS — Z95.0 CARDIAC PACEMAKER IN SITU: ICD-10-CM

## 2024-07-05 PROCEDURE — 93296 REM INTERROG EVL PM/IDS: CPT

## 2024-07-11 ENCOUNTER — TELEPHONE (OUTPATIENT)
Dept: PRIMARY CARE | Facility: CLINIC | Age: 59
End: 2024-07-11
Payer: COMMERCIAL

## 2024-07-13 DIAGNOSIS — Z95.818 PRESENCE OF AMULET LEFT ATRIAL APPENDAGE CLOSURE DEVICE: ICD-10-CM

## 2024-07-14 RX ORDER — CLOPIDOGREL BISULFATE 75 MG/1
75 TABLET ORAL DAILY
Qty: 180 TABLET | Refills: 0 | Status: SHIPPED | OUTPATIENT
Start: 2024-07-14

## 2024-07-18 ENCOUNTER — TELEPHONE (OUTPATIENT)
Dept: GASTROENTEROLOGY | Facility: CLINIC | Age: 59
End: 2024-07-18
Payer: COMMERCIAL

## 2024-07-18 NOTE — TELEPHONE ENCOUNTER
I left a message to call [phone] to reschedule virtual visit with ARMANDO Mosqueda.  I canceled her 8/21/24 appointment, provider will not be in office.  2 WEEK POST EGD

## 2024-07-29 NOTE — PREPROCEDURE INSTRUCTIONS
NPO Instructions:  Do not eat any food after midnight the night before your surgery/procedure.    Additional Instructions:

## 2024-08-02 ENCOUNTER — ANESTHESIA (OUTPATIENT)
Dept: GASTROENTEROLOGY | Facility: HOSPITAL | Age: 59
End: 2024-08-02
Payer: COMMERCIAL

## 2024-08-02 ENCOUNTER — HOSPITAL ENCOUNTER (OUTPATIENT)
Dept: GASTROENTEROLOGY | Facility: HOSPITAL | Age: 59
Setting detail: OUTPATIENT SURGERY
Discharge: HOME | End: 2024-08-02
Payer: COMMERCIAL

## 2024-08-02 ENCOUNTER — ANESTHESIA EVENT (OUTPATIENT)
Dept: GASTROENTEROLOGY | Facility: HOSPITAL | Age: 59
End: 2024-08-02
Payer: COMMERCIAL

## 2024-08-02 VITALS
TEMPERATURE: 96.6 F | HEART RATE: 61 BPM | BODY MASS INDEX: 29.01 KG/M2 | DIASTOLIC BLOOD PRESSURE: 72 MMHG | HEIGHT: 70 IN | WEIGHT: 202.6 LBS | OXYGEN SATURATION: 97 % | RESPIRATION RATE: 16 BRPM | SYSTOLIC BLOOD PRESSURE: 110 MMHG

## 2024-08-02 DIAGNOSIS — R10.13 EPIGASTRIC PAIN: ICD-10-CM

## 2024-08-02 DIAGNOSIS — K21.9 GASTROESOPHAGEAL REFLUX DISEASE WITHOUT ESOPHAGITIS: ICD-10-CM

## 2024-08-02 PROCEDURE — 7100000009 HC PHASE TWO TIME - INITIAL BASE CHARGE

## 2024-08-02 PROCEDURE — 3700000002 HC GENERAL ANESTHESIA TIME - EACH INCREMENTAL 1 MINUTE

## 2024-08-02 PROCEDURE — 2500000004 HC RX 250 GENERAL PHARMACY W/ HCPCS (ALT 636 FOR OP/ED): Performed by: NURSE ANESTHETIST, CERTIFIED REGISTERED

## 2024-08-02 PROCEDURE — 2500000004 HC RX 250 GENERAL PHARMACY W/ HCPCS (ALT 636 FOR OP/ED): Performed by: STUDENT IN AN ORGANIZED HEALTH CARE EDUCATION/TRAINING PROGRAM

## 2024-08-02 PROCEDURE — 43239 EGD BIOPSY SINGLE/MULTIPLE: CPT | Performed by: STUDENT IN AN ORGANIZED HEALTH CARE EDUCATION/TRAINING PROGRAM

## 2024-08-02 PROCEDURE — 3700000001 HC GENERAL ANESTHESIA TIME - INITIAL BASE CHARGE

## 2024-08-02 PROCEDURE — 7100000010 HC PHASE TWO TIME - EACH INCREMENTAL 1 MINUTE

## 2024-08-02 RX ORDER — SODIUM CHLORIDE, SODIUM LACTATE, POTASSIUM CHLORIDE, CALCIUM CHLORIDE 600; 310; 30; 20 MG/100ML; MG/100ML; MG/100ML; MG/100ML
20 INJECTION, SOLUTION INTRAVENOUS CONTINUOUS
Status: DISCONTINUED | OUTPATIENT
Start: 2024-08-02 | End: 2024-08-03 | Stop reason: HOSPADM

## 2024-08-02 RX ORDER — PROPOFOL 10 MG/ML
INJECTION, EMULSION INTRAVENOUS AS NEEDED
Status: DISCONTINUED | OUTPATIENT
Start: 2024-08-02 | End: 2024-08-02

## 2024-08-02 RX ADMIN — PROPOFOL 200 MG: 10 INJECTION, EMULSION INTRAVENOUS at 13:17

## 2024-08-02 SDOH — HEALTH STABILITY: MENTAL HEALTH: CURRENT SMOKER: 0

## 2024-08-02 ASSESSMENT — PAIN SCALES - GENERAL
PAINLEVEL_OUTOF10: 0 - NO PAIN
PAIN_LEVEL: 0
PAINLEVEL_OUTOF10: 0 - NO PAIN
PAINLEVEL_OUTOF10: 0 - NO PAIN

## 2024-08-02 ASSESSMENT — COLUMBIA-SUICIDE SEVERITY RATING SCALE - C-SSRS
1. IN THE PAST MONTH, HAVE YOU WISHED YOU WERE DEAD OR WISHED YOU COULD GO TO SLEEP AND NOT WAKE UP?: NO
2. HAVE YOU ACTUALLY HAD ANY THOUGHTS OF KILLING YOURSELF?: NO

## 2024-08-02 ASSESSMENT — PAIN - FUNCTIONAL ASSESSMENT
PAIN_FUNCTIONAL_ASSESSMENT: 0-10

## 2024-08-02 NOTE — Clinical Note
Patient tolerated procedure well. Appears comfortable with no complaints of pain. VS stable. Arousable prior to transport. Patient transported to [facility] via cart.  Report called              . Handoff completed

## 2024-08-02 NOTE — H&P
Outpatient Hospital Procedure H&P    Patient Profile  Name Yarelis Brantley  Date of Birth 1965  MRN [id number]  PCP Tesha Johnston        Diagnosis: GERD, N/Vm diarrhea.  Procedure(s):  EGD.    Allergies  Allergies   Allergen Reactions    Prochlorperazine Rash and Nausea/vomiting     vomiting       Past Medical History   Past Medical History:   Diagnosis Date    Anxiety     COVID-19     VACCINATED    Diarrhea     Dorsalgia, unspecified     Disabling back pain    Encounter for follow-up examination after completed treatment for conditions other than malignant neoplasm 12/14/2021    Encounter for examination following treatment at hospital    Encounter for other administrative examinations 12/14/2021    Encounter for completion of form with patient    Epigastric abdominal tenderness 12/14/2021    Epigastric abdominal tenderness    Fever, unspecified 12/10/2021    Low grade fever    GERD (gastroesophageal reflux disease)     Hyperlipidemia     Other conditions influencing health status 12/10/2021    History of cough    Other disturbances of smell and taste 12/10/2021    COVID-19 long hauler manifesting chronic loss of smell and taste    Personal history of other diseases of the circulatory system 03/21/2017    History of essential hypertension    Personal history of other diseases of the circulatory system 03/21/2017    History of atrial fibrillation    Personal history of other diseases of the circulatory system     History of paroxysmal supraventricular tachycardia    Personal history of other diseases of the digestive system 03/21/2017    History of esophageal reflux    Personal history of other diseases of the digestive system 01/19/2022    History of chronic constipation    Personal history of other diseases of the digestive system     History of esophageal spasm    Personal history of other diseases of the digestive system     History of gastroesophageal reflux (GERD)    Personal history of other  diseases of the musculoskeletal system and connective tissue 12/20/2017    History of low back pain    Personal history of other endocrine, nutritional and metabolic disease 03/21/2017    History of hyperlipidemia    Personal history of other medical treatment     History of screening mammography    Personal history of other mental and behavioral disorders 03/21/2017    History of depression    Personal history of other mental and behavioral disorders     History of depression    Personal history of other specified conditions 01/20/2022    History of diarrhea    Personal history of other specified conditions 01/20/2022    History of nausea and vomiting    Personal history of other specified conditions 01/20/2022    History of epigastric pain    Personal history of other specified conditions 07/15/2021    History of dysphagia    Personal history of other specified conditions 12/14/2021    History of abdominal pain    Personal history of other specified conditions     History of chest pain    Personal history of other specified conditions     History of syncope    Personal history of other specified conditions     History of vomiting    Personal history of other specified conditions 12/04/2019    History of urinary frequency    Personal history of urinary (tract) infections 12/26/2019    History of urinary tract infection    RLS (restless legs syndrome)     Shortness of breath     SOBOE (shortness of breath on exertion)    Stroke (Multi)     TIA (transient ischemic attack)     Urge incontinence 11/13/2020    Urge incontinence       Medication Reviewed - yes  Prior to Admission medications    Medication Sig Start Date End Date Taking? Authorizing Provider   aspirin 81 mg EC tablet Take 1 tablet (81 mg) by mouth once daily.   Yes Historical Provider, MD   melatonin 10 mg tablet,ext release multiphase Take 1 tablet by mouth once daily at bedtime. 2/22/17  Yes Historical Provider, MD   metoprolol tartrate (Lopressor) 25  mg tablet TAKE 1 TABLET BY MOUTH TWO TIMES A DAY 3/15/24 3/15/25 Yes CELSA Walker   oxybutynin XL (Ditropan XL) 10 mg 24 hr tablet Take 2 tablets (20 mg) by mouth once daily. Do not crush, chew, or split. 6/3/24 11/30/24 Yes CELSA Mandel   pantoprazole (ProtoNix) 40 mg EC tablet Take 1 tablet (40 mg) by mouth 2 times a day. Do not crush, chew, or split. 7/1/24 8/30/24 Yes Tesha Johnston MD   sertraline (Zoloft) 100 mg tablet Take 1 tablet (100 mg) by mouth once daily.   Yes Historical Provider, MD   simvastatin (Zocor) 20 mg tablet TAKE 1 TABLET BY MOUTH EVERY NIGHT AT BEDTIME 3/15/24 3/15/25 Yes CELSA Walker   clopidogrel (Plavix) 75 mg tablet Take 1 tablet (75 mg) by mouth once daily. 7/14/24   RANJIT Walker-CNP   amitriptyline (Elavil) 10 mg tablet Take 1 tablet (10 mg) by mouth as needed at bedtime for sleep. 12/26/19 7/29/24  Historical Provider, MD   cyclobenzaprine (Flexeril) 5 mg tablet Take 1 tablet (5 mg) by mouth once daily as needed.  7/29/24  Historical Provider, MD   methen-m.blue-s.phos-phsal-hyo (UribeL) 118-10-40.8-36 mg capsule Take 1 capsule by mouth every 8 hours if needed. 2/14/19 7/29/24  Historical Provider, MD   rOPINIRole (Requip) 0.25 mg tablet Take 1 tablet (0.25 mg) by mouth as needed at bedtime. 8/22/22 7/29/24  Historical Provider, MD   sucralfate (Carafate) 1 gram tablet Take 1 tablet (1 g) by mouth once daily as needed.  7/29/24  Historical Provider, MD       Physical Exam  Vitals:    08/02/24 1204   BP: 115/75   Pulse: 83   Resp: 16   Temp: 36.1 °C (97 °F)   SpO2: 95%      Weight   Vitals:    08/02/24 1204   Weight: 91.9 kg (202 lb 9.6 oz)     BMI Body mass index is 29.07 kg/m².    General: A&Ox3, NAD.  HEENT: AT/NC.   CV: RRR.  Resp: CTA bilaterally. No wheezing, rhonchi or rales.   GI: Soft, NT/ND.   Extrem: No edema.   Skin: No Jaundice.   Neuro: No focal deficits.   Psych: Normal mood and affect.       Sedation Plan: Deep Sedation.  Procedure Plan - pre-procedural (re)assesment completed by physician:  discharge/transfer patient when discharge criteria met    Dejan Conklin DO  8/2/2024 12:49 PM

## 2024-08-02 NOTE — DISCHARGE INSTRUCTIONS
Patient Instructions after an EGD      The anesthetics, sedatives or narcotics which were given to you today will be acting in your body for the next 24 hours, so you might feel a little sleepy or groggy.  This feeling should slowly wear off. Carefully read and follow the instructions.     You received sedation today:  - Do not drive or operate any machinery or power tools of any kind.   - No alcoholic beverages today, not even beer or wine.  - No over the counter medications that contain alcohol or that may cause drowsiness.  - Do not make any important decisions or sign any legal documents.    While it is common to experience mild to moderate abdominal distention, gas, or belching after your procedure, if any of these symptoms occur following discharge from the GI Lab or within one week of having your procedure, call the [facility] to be advised whether a visit to your nearest Urgent Care or Emergency Department is indicated.  Take this paper with you if you go.     - If you develop an allergic reaction to the medications that were given during your procedure such as difficulty breathing, rash, hives, severe nausea, vomiting or lightheadedness.  - If you experience chest pain, shortness of breath, severe abdominal pain, fevers and chills.  -If you develop signs and symptoms of bleeding such as blood in your spit, if your stools turn black, tarry, or bloody  - If you have not urinated within 8 hours following your procedure.  - If your IV site becomes painful, red, inflamed, or looks infected.    If you received a biopsy/polypectomy/sphincterotomy the following instructions apply below:    __ Do not use Aspirin containing products, non-steroidal medications or anti-coagulants for one week following your procedure. (Examples of these types of medications are: Advil, Arthrotec, Aleve, Coumadin, Ecotrin, Heparin, Ibuprofen, Indocin, Motrin, Naprosyn, Nuprin, Plavix, Vioxx, and Voltarin, or their  generic forms.  This list is not all-inclusive.  Check with your physician or pharmacist before resuming medications.)   __ Eat a soft diet today.  Avoid foods that are poorly digested for the next 24 hours.  These foods would include: nuts, beans, lettuce, red meats, and fried foods. Start with liquids and advance your diet as tolerated, gradually work up to eating solids.   __ Do not have a Barium Study or Enema for one week.    Your physician recommends the additional following instructions:    -You have a contact number available for emergencies. The signs and symptoms of potential delayed complications were discussed with you. You may return to normal activities tomorrow.  -Resume your previous diet.  -Continue your present medications.   -We are waiting for your pathology results.  -The findings and recommendations have been discussed with you.  -The findings and recommendations were discussed with your family.  - Please see Medication Reconciliation Form for new medication/medications prescribed.

## 2024-08-02 NOTE — ANESTHESIA PREPROCEDURE EVALUATION
"Patient: Yarelis Brantley \"Thu\"    Procedure Information       Date/Time: 08/02/24 1310    Scheduled providers: Dejan Conklin DO; rFanklin Polk MD    Procedure: EGD    Location: [facility]            Relevant Problems   Cardiac   (+) Atrial fibrillation (Multi)   (+) Cardiac pacemaker in situ      Neuro   (+) Anxiety       Clinical information reviewed:   Tobacco  Allergies  Meds   Med Hx  Surg Hx  OB Status  Fam Hx  Soc   Hx        NPO Detail:  NPO/Void Status  Date of Last Liquid: 08/01/24  Time of Last Liquid: 1800  Date of Last Solid: 08/01/24  Time of Last Solid: 1800         Physical Exam    Airway  Mallampati: II  TM distance: >3 FB  Neck ROM: full     Cardiovascular    Dental - normal exam     Pulmonary    Abdominal        Anesthesia Plan    History of general anesthesia?: yes  History of complications of general anesthesia?: no    ASA 3     MAC     The patient is not a current smoker.    intravenous induction   Anesthetic plan and risks discussed with patient.    Plan discussed with CRNA.  "

## 2024-08-02 NOTE — ANESTHESIA POSTPROCEDURE EVALUATION
[facility]  [facility]  [address] [place] [address] [address]IVE  [address]  [place] [address]  Dept: [phone]  Dept Fax: [phone]  Loc: [phone]      Visit Date: 6/24/2024    Anuj De La Cruz a 51 y.o. male who presents today for:  Chief Complaint   Patient presents with    Annual Exam         HPI:     Anuj is in for his physical exam.  51 years old had lab review and denies any new problems.    Past Medical History:   Diagnosis Date    Familial heart disease 8/28/2017    Gastroesophageal reflux disease without esophagitis 8/28/2017    GERD (gastroesophageal reflux disease)     Hypercholesteremia 8/28/2017    Hypertension     Precordial pain 8/28/2017      Past Surgical History:   Procedure Laterality Date    APPENDECTOMY      BREAST BIOPSY  02/17/2012    Dr Arora, US guided right, benign gynecomastia    COLONOSCOPY N/A 03/06/2023    Dr KASSANDRA Wei-Small amount of residual stool throughout the colon-AP x 1, 5 yr recall    UPPER GASTROINTESTINAL ENDOSCOPY N/A 01/11/2021    Dr KASSANDRA Wei-[id number]-appearing stricture in the distal esophagus, gastritis    UPPER GASTROINTESTINAL ENDOSCOPY N/A 01/11/2021    Dr KASSANDRA Wei-w/[id number]-appearing stricture in the distal esophagus, gastritis       Family History   Problem Relation Age of Onset    Heart Disease Father     Colon Cancer Neg Hx     Esophageal Cancer Neg Hx     Liver Cancer Neg Hx     Rectal Cancer Neg Hx     Stomach Cancer Neg Hx        Social History     Tobacco Use    Smoking status: Former     Current packs/day: 0.00     Average packs/day: 1 pack/day for 23.0 years (23.0 ttl pk-yrs)     Types: Cigarettes     Start date: 1/1/1986     Quit date: 2009     Years since quitting: 15.4    Smokeless tobacco: Never   Substance Use Topics    Alcohol use: No      Current Outpatient Medications   Medication Sig Dispense Refill    amLODIPine (NORVASC) 10 MG tablet TAKE 1 TABLET BY MOUTH DAILY 90 tablet 1    lisinopril (PRINIVIL;ZESTRIL) 
"Patient: Yarelis Brantley \"Thu\"    Procedure Summary       Date: 08/02/24 Room / Location: [facility]    Anesthesia Start: 1312 Anesthesia Stop: 1323    Procedure: EGD Diagnosis:       Gastroesophageal reflux disease without esophagitis      Epigastric pain    Scheduled Providers: Dejan Conklin DO; Franklin Polk MD Responsible Provider: Franklin Polk MD    Anesthesia Type: MAC ASA Status: 3            Anesthesia Type: MAC    Vitals Value Taken Time   /67 08/02/24 1324   Temp 36.4 08/02/24 1324   Pulse 61 08/02/24 1324   Resp 18 08/02/24 1324   SpO2 97 08/02/24 1324       Anesthesia Post Evaluation    Patient location during evaluation: bedside  Patient participation: complete - patient participated  Level of consciousness: awake and alert  Pain score: 0  Pain management: adequate  Airway patency: patent  Cardiovascular status: acceptable and stable  Respiratory status: acceptable and room air  Hydration status: acceptable  Postoperative Nausea and Vomiting: none      No notable events documented.    "
DISPLAY PLAN FREE TEXT
DISPLAY PLAN FREE TEXT

## 2024-08-02 NOTE — Clinical Note
Huddle and Timeout completed together with team. Patient wristband and AYLA information verified.  Anesthesia safety check completed

## 2024-08-05 DIAGNOSIS — Z12.31 ENCOUNTER FOR SCREENING MAMMOGRAM FOR BREAST CANCER: ICD-10-CM

## 2024-08-07 PROCEDURE — RXMED WILLOW AMBULATORY MEDICATION CHARGE

## 2024-08-09 ENCOUNTER — PHARMACY VISIT (OUTPATIENT)
Dept: PHARMACY | Facility: CLINIC | Age: 59
End: 2024-08-09
Payer: COMMERCIAL

## 2024-08-13 LAB
LABORATORY COMMENT REPORT: NORMAL
PATH REPORT.FINAL DX SPEC: NORMAL
PATH REPORT.GROSS SPEC: NORMAL
PATH REPORT.TOTAL CANCER: NORMAL

## 2024-08-21 ENCOUNTER — APPOINTMENT (OUTPATIENT)
Dept: GASTROENTEROLOGY | Facility: CLINIC | Age: 59
End: 2024-08-21
Payer: COMMERCIAL

## 2024-08-28 ENCOUNTER — APPOINTMENT (OUTPATIENT)
Dept: GASTROENTEROLOGY | Facility: CLINIC | Age: 59
End: 2024-08-28
Payer: COMMERCIAL

## 2024-09-17 ENCOUNTER — APPOINTMENT (OUTPATIENT)
Dept: CARDIOLOGY | Facility: CLINIC | Age: 59
End: 2024-09-17
Payer: COMMERCIAL

## 2024-09-17 ENCOUNTER — HOSPITAL ENCOUNTER (OUTPATIENT)
Dept: CARDIOLOGY | Facility: HOSPITAL | Age: 59
Discharge: HOME | End: 2024-09-17
Payer: COMMERCIAL

## 2024-09-17 VITALS
DIASTOLIC BLOOD PRESSURE: 66 MMHG | WEIGHT: 208 LBS | BODY MASS INDEX: 29.78 KG/M2 | HEIGHT: 70 IN | HEART RATE: 86 BPM | SYSTOLIC BLOOD PRESSURE: 110 MMHG

## 2024-09-17 DIAGNOSIS — Z87.891 FORMER SMOKER: ICD-10-CM

## 2024-09-17 DIAGNOSIS — Z95.818 PRESENCE OF AMULET LEFT ATRIAL APPENDAGE CLOSURE DEVICE: ICD-10-CM

## 2024-09-17 DIAGNOSIS — Z95.818 PRESENCE OF WATCHMAN LEFT ATRIAL APPENDAGE CLOSURE DEVICE: ICD-10-CM

## 2024-09-17 DIAGNOSIS — G45.9 TIA (TRANSIENT ISCHEMIC ATTACK): ICD-10-CM

## 2024-09-17 DIAGNOSIS — Z95.0 CARDIAC PACEMAKER IN SITU: ICD-10-CM

## 2024-09-17 DIAGNOSIS — Z71.89 ENCOUNTER TO DISCUSS TREATMENT OPTIONS: ICD-10-CM

## 2024-09-17 DIAGNOSIS — Z71.89 ENCOUNTER FOR MEDICATION REVIEW AND COUNSELING: ICD-10-CM

## 2024-09-17 DIAGNOSIS — I49.5 SINUS NODE DYSFUNCTION (MULTI): ICD-10-CM

## 2024-09-17 DIAGNOSIS — Z95.0 CARDIAC PACEMAKER IN SITU: Primary | ICD-10-CM

## 2024-09-17 DIAGNOSIS — I48.0 PAROXYSMAL ATRIAL FIBRILLATION (MULTI): ICD-10-CM

## 2024-09-17 PROCEDURE — 93280 PM DEVICE PROGR EVAL DUAL: CPT | Performed by: INTERNAL MEDICINE

## 2024-09-17 PROCEDURE — 93280 PM DEVICE PROGR EVAL DUAL: CPT

## 2024-09-17 PROCEDURE — 99214 OFFICE O/P EST MOD 30 MIN: CPT | Performed by: INTERNAL MEDICINE

## 2024-09-17 PROCEDURE — 93000 ELECTROCARDIOGRAM COMPLETE: CPT | Mod: DISTINCT PROCEDURAL SERVICE | Performed by: INTERNAL MEDICINE

## 2024-09-17 PROCEDURE — 1036F TOBACCO NON-USER: CPT | Performed by: INTERNAL MEDICINE

## 2024-09-17 PROCEDURE — 3008F BODY MASS INDEX DOCD: CPT | Performed by: INTERNAL MEDICINE

## 2024-09-17 ASSESSMENT — ENCOUNTER SYMPTOMS
PALPITATIONS: 0
DYSPNEA ON EXERTION: 0

## 2024-09-17 NOTE — PROGRESS NOTES
"Chief Complaint:   Follow-up (6 month with device check)     History Of Present Illness:    Thu Brantley is a 59 y.o. female presenting with follow-up.     Patient denies any arrhythmia symptoms of palpitation, lightheadedness, near syncope, or syncope.    Her device site is well-healed and unchanged.    Last Recorded Vitals:  Vitals:    09/17/24 0810   BP: 110/66   BP Location: Left arm   Patient Position: Sitting   Pulse: 86   Weight: 94.3 kg (208 lb)   Height: 1.778 m (5' 10\")       Past Medical History:  See List    Past Surgical History:  See List      Social History:  She reports that she has quit smoking. Her smoking use included cigarettes. She has a 20 pack-year smoking history. She has never used smokeless tobacco. She reports that she does not currently use alcohol. She reports that she does not use drugs.    Family History:  Family History   Problem Relation Name Age of Onset    Leukemia Mother Holly     Atrial fibrillation Mother Holly     Cancer Mother Holly     Heart disease Mother Holly     Coronary artery disease Father Rodo     Atrial fibrillation Father Rodo     Depression Father Rodo     Heart disease Father Rodo         Allergies:  Prochlorperazine    Outpatient Medications:  Current Outpatient Medications   Medication Instructions    aspirin 81 mg, oral, Daily    clopidogrel (PLAVIX) 75 mg, oral, Daily    melatonin 10 mg tablet,ext release multiphase 1 tablet, oral, Nightly    metoprolol tartrate (Lopressor) 25 mg tablet TAKE 1 TABLET BY MOUTH TWO TIMES A DAY    oxybutynin XL (DITROPAN XL) 20 mg, oral, Daily, Do not crush, chew, or split.    pantoprazole (PROTONIX) 40 mg, oral, 2 times daily, Do not crush, chew, or split.    sertraline (ZOLOFT) 100 mg, oral, Daily    simvastatin (Zocor) 20 mg tablet TAKE 1 TABLET BY MOUTH EVERY NIGHT AT BEDTIME   Review of Systems   Cardiovascular:  Negative for chest pain, dyspnea on exertion and palpitations.   All other systems reviewed and are " negative.        Physical Exam:  Constitutional:       General: Awake.      Appearance: Normal and healthy appearance. Well-developed and not in distress.   Neck:      Vascular: No JVR. JVD normal.   Pulmonary:      Effort: Pulmonary effort is normal.      Breath sounds: Normal breath sounds. No wheezing. No rhonchi. No rales.   Chest:      Chest wall: Not tender to palpatation.   Cardiovascular:      PMI at left midclavicular line. Normal rate. Regular rhythm. Normal S1. Normal S2.       Murmurs: There is no murmur.      No gallop.  No click. No rub.   Pulses:     Intact distal pulses.   Edema:     Peripheral edema absent.   Abdominal:      Tenderness: There is no abdominal tenderness.   Musculoskeletal: Normal range of motion.         General: No tenderness. Skin:     General: Skin is warm and dry.   Neurological:      General: No focal deficit present.      Mental Status: Alert and oriented to person, place and time.            Last Labs:  CBC -  Lab Results   Component Value Date    WBC 7.8 03/09/2024    HGB 14.0 03/09/2024    HCT 42.0 03/09/2024    MCV 92 03/09/2024     03/09/2024       CMP -  Lab Results   Component Value Date    CALCIUM 9.0 06/26/2024    PHOS 3.6 06/26/2024    PROT 7.2 03/09/2024    ALBUMIN 4.0 06/26/2024    AST 21 03/09/2024    ALT 16 03/09/2024    ALKPHOS 79 03/09/2024    BILITOT 0.7 03/09/2024       LIPID PANEL -   Lab Results   Component Value Date    CHOL 189 01/16/2023    TRIG 99 01/16/2023    HDL 47.4 01/16/2023    CHHDL 4.0 01/16/2023    LDLF 122 (H) 01/16/2023    VLDL 20 01/16/2023       RENAL FUNCTION PANEL -   Lab Results   Component Value Date    GLUCOSE 119 (H) 06/26/2024     06/26/2024    K 4.3 06/26/2024     06/26/2024    CO2 25 06/26/2024    ANIONGAP 12 06/26/2024    BUN 15 06/26/2024    CREATININE 0.86 06/26/2024    CALCIUM 9.0 06/26/2024    PHOS 3.6 06/26/2024    ALBUMIN 4.0 06/26/2024        Lab Results   Component Value Date    HGBA1C 5.4 07/01/2024        Last Cardiology Tests:  ECG:  ECG 12 lead daily 03/08/2024    Today.  Appropriate pacing.  Normal axis.  Corrected QT interval 440 ms.  First-degree AV block    Device check today Medtronic JULISA NARVAEZ 01 pacemaker.  Estimated longevity device over 1 year.  89% atrial pacing.  21% ventricular pacing.  0 A-fib.  0 VT                                                                                          Echo:  Transthoracic Echo (TTE) Limited 03/08/2024        Cath:  Cardiac Catheterization Procedure 03/08/2024      CT scan July 2, 2024.  Well-seated left atrial appendage closure device clinic.  No significant leak.  No  thrombus on external surface.    Lab review: I have personally reviewed the laboratory result(s) see above    Assessment/Plan   Diagnoses and all orders for this visit:  Cardiac pacemaker in situ  Paroxysmal atrial fibrillation (Multi)  Sinus node dysfunction (Multi)  TIA (transient ischemic attack)  Former smoker  BMI 29.0-29.9,adult  Encounter for medication review and counseling  Encounter to discuss treatment options        Pilar Lee RN      Chronotropic incompetence. Sinus node dysfunction, near syncope, status post dual-chamber pacemaker 2013. No recurrence of near syncope after pacemaker implant  Medtronic Bianca NARVAEZ MRI pacemaker. Reviewed device checks. Normal device function. Ordered device checks. Will continue to alternate remote checks with device clinic paired with EP OV  s/p explantation of a [employer] LINQ loop recorder.  Paroxysmal atrial fibrillation, chronic. Stable.  Continue to monitor burden.  Status post watchman 2024.  CT follow-up showed no leak and no thrombus.  Possible TIA symptoms.   PSVT, s/p remote slow pathway ablation for the treatment of atrioventricular node reentry tachycardia. Chronic. Resolved. No clinical recurrence by serial device checks.  Autonomic dysfunction and dizziness. Behavioral modification and risk factors reviewed. Continue increased  fluid intake. Reinforced compression stockings.   History of tobacco use.  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   Interstitial cystitis. Chronic.  History of shingles  Overweight   AHA recommendations for exercise, diet, and behavioral modification reviewed with pt.     Counseling over 50% visit performed.The patient and I discussed the mechanism of arrhythmia, atrial fibrillation, watchman CT follow-up, device check, normal device pocket, medications, ECG, indications for and types of medications, discussion if and what medication refills needed, treatment options, risks, benefits, and imponderables. American Heart Association lifestyle changes and behavioral modification discussed. All questions answered in detail.  Patient appreciative of care.

## 2024-09-17 NOTE — PATIENT INSTRUCTIONS
Continue same medications/treatment.  Patient educated on proper medication use.  Patient educated on risk factor modification.  Please bring any lab results from other providers/physicians to your next appointment.    Please bring all medicines, vitamins, and herbal supplements with you when you come to the office.    Prescriptions will not be filled unless you are compliant with your follow up appointments or have a follow up appointment scheduled as per instruction of your physician. Refills should be requested at the time of your visit.    Follow up with Dr. Zuniga in 6 months with device check   Continue remote checks at 3 and 9 months    CULLEN WRIGHT RN, AM SCRIBING FOR AND IN THE PRESENCE OF DR. HERNAN ZUNIGA MD, FACC, FACP, RS

## 2024-09-18 ENCOUNTER — TELEPHONE (OUTPATIENT)
Dept: CARDIOLOGY | Facility: HOSPITAL | Age: 59
End: 2024-09-18
Payer: COMMERCIAL

## 2024-09-18 NOTE — TELEPHONE ENCOUNTER
Contacted patient via phone, identified patient by name and date of birth. RN reviewed patients medication history. Patient denies cardiac procedures, surgeries, stroke, TIA or any bleeding events. Patient instructed to stop Plavix and continue 81mg aspirin indefinitely. Patient verbalized understanding. Information will be entered into the [facility] registry.

## 2024-09-22 PROCEDURE — RXMED WILLOW AMBULATORY MEDICATION CHARGE

## 2024-09-26 ENCOUNTER — PHARMACY VISIT (OUTPATIENT)
Dept: PHARMACY | Facility: CLINIC | Age: 59
End: 2024-09-26
Payer: COMMERCIAL

## 2024-09-26 PROCEDURE — RXMED WILLOW AMBULATORY MEDICATION CHARGE

## 2024-10-01 ENCOUNTER — PHARMACY VISIT (OUTPATIENT)
Dept: PHARMACY | Facility: CLINIC | Age: 59
End: 2024-10-01
Payer: COMMERCIAL

## 2024-10-20 DIAGNOSIS — R11.2 NAUSEA AND VOMITING, UNSPECIFIED VOMITING TYPE: ICD-10-CM

## 2024-10-21 PROCEDURE — RXMED WILLOW AMBULATORY MEDICATION CHARGE

## 2024-10-21 RX ORDER — PANTOPRAZOLE SODIUM 40 MG/1
40 TABLET, DELAYED RELEASE ORAL 2 TIMES DAILY
Qty: 60 TABLET | Refills: 1 | Status: SHIPPED | OUTPATIENT
Start: 2024-10-21 | End: 2024-12-20

## 2024-10-22 ENCOUNTER — LAB (OUTPATIENT)
Dept: LAB | Facility: LAB | Age: 59
End: 2024-10-22
Payer: COMMERCIAL

## 2024-10-22 DIAGNOSIS — N32.81 OAB (OVERACTIVE BLADDER): Primary | ICD-10-CM

## 2024-10-22 DIAGNOSIS — N32.81 OAB (OVERACTIVE BLADDER): ICD-10-CM

## 2024-10-22 PROCEDURE — 87086 URINE CULTURE/COLONY COUNT: CPT

## 2024-10-22 PROCEDURE — 87186 SC STD MICRODIL/AGAR DIL: CPT

## 2024-10-22 PROCEDURE — 81001 URINALYSIS AUTO W/SCOPE: CPT

## 2024-10-23 ENCOUNTER — PHARMACY VISIT (OUTPATIENT)
Dept: PHARMACY | Facility: CLINIC | Age: 59
End: 2024-10-23
Payer: COMMERCIAL

## 2024-10-23 LAB
APPEARANCE UR: CLEAR
BACTERIA #/AREA URNS AUTO: ABNORMAL /HPF
BILIRUB UR STRIP.AUTO-MCNC: NEGATIVE MG/DL
CAOX CRY #/AREA UR COMP ASSIST: ABNORMAL /HPF
COLOR UR: YELLOW
GLUCOSE UR STRIP.AUTO-MCNC: NORMAL MG/DL
HOLD SPECIMEN: NORMAL
KETONES UR STRIP.AUTO-MCNC: NEGATIVE MG/DL
LEUKOCYTE ESTERASE UR QL STRIP.AUTO: ABNORMAL
MUCOUS THREADS #/AREA URNS AUTO: ABNORMAL /LPF
NITRITE UR QL STRIP.AUTO: NEGATIVE
PH UR STRIP.AUTO: 5.5 [PH]
PROT UR STRIP.AUTO-MCNC: NEGATIVE MG/DL
RBC # UR STRIP.AUTO: NEGATIVE /UL
RBC #/AREA URNS AUTO: ABNORMAL /HPF
SP GR UR STRIP.AUTO: 1.03
SQUAMOUS #/AREA URNS AUTO: ABNORMAL /HPF
UROBILINOGEN UR STRIP.AUTO-MCNC: ABNORMAL MG/DL
WBC #/AREA URNS AUTO: ABNORMAL /HPF

## 2024-10-24 DIAGNOSIS — N30.00 ACUTE CYSTITIS WITHOUT HEMATURIA: Primary | ICD-10-CM

## 2024-10-24 PROCEDURE — RXMED WILLOW AMBULATORY MEDICATION CHARGE

## 2024-10-24 RX ORDER — NITROFURANTOIN 25; 75 MG/1; MG/1
100 CAPSULE ORAL 2 TIMES DAILY
Qty: 14 CAPSULE | Refills: 0 | Status: SHIPPED | OUTPATIENT
Start: 2024-10-24 | End: 2024-11-02

## 2024-10-25 LAB — BACTERIA UR CULT: ABNORMAL

## 2024-10-25 RX ORDER — DIAZEPAM 10 MG/1
TABLET ORAL
COMMUNITY
Start: 2024-06-12

## 2024-10-25 RX ORDER — AMOXICILLIN 875 MG/1
TABLET, FILM COATED ORAL
COMMUNITY
Start: 2024-06-12

## 2024-10-26 ENCOUNTER — PHARMACY VISIT (OUTPATIENT)
Dept: PHARMACY | Facility: CLINIC | Age: 59
End: 2024-10-26
Payer: COMMERCIAL

## 2024-10-29 DIAGNOSIS — F41.9 ANXIETY: Primary | ICD-10-CM

## 2024-10-29 PROCEDURE — RXMED WILLOW AMBULATORY MEDICATION CHARGE

## 2024-10-29 RX ORDER — SERTRALINE HYDROCHLORIDE 100 MG/1
100 TABLET, FILM COATED ORAL DAILY
Qty: 90 TABLET | Refills: 1 | Status: SHIPPED | OUTPATIENT
Start: 2024-10-29

## 2024-10-30 ENCOUNTER — OFFICE VISIT (OUTPATIENT)
Dept: PLASTIC SURGERY | Facility: CLINIC | Age: 59
End: 2024-10-30
Payer: COMMERCIAL

## 2024-10-30 ENCOUNTER — APPOINTMENT (OUTPATIENT)
Dept: NEUROLOGY | Facility: CLINIC | Age: 59
End: 2024-10-30
Payer: COMMERCIAL

## 2024-10-30 VITALS
DIASTOLIC BLOOD PRESSURE: 76 MMHG | HEIGHT: 70 IN | BODY MASS INDEX: 30.58 KG/M2 | SYSTOLIC BLOOD PRESSURE: 110 MMHG | HEART RATE: 76 BPM | WEIGHT: 213.6 LBS

## 2024-10-30 VITALS
BODY MASS INDEX: 29.78 KG/M2 | WEIGHT: 208 LBS | HEIGHT: 70 IN | SYSTOLIC BLOOD PRESSURE: 110 MMHG | DIASTOLIC BLOOD PRESSURE: 72 MMHG

## 2024-10-30 DIAGNOSIS — G45.9 TIA (TRANSIENT ISCHEMIC ATTACK): Primary | ICD-10-CM

## 2024-10-30 DIAGNOSIS — G56.03 CARPAL TUNNEL SYNDROME ON BOTH SIDES: Primary | ICD-10-CM

## 2024-10-30 DIAGNOSIS — E78.2 MIXED DYSLIPIDEMIA: ICD-10-CM

## 2024-10-30 DIAGNOSIS — I48.0 PAROXYSMAL ATRIAL FIBRILLATION (MULTI): ICD-10-CM

## 2024-10-30 DIAGNOSIS — G43.109 MIGRAINE WITH AURA AND WITHOUT STATUS MIGRAINOSUS, NOT INTRACTABLE: ICD-10-CM

## 2024-10-30 PROCEDURE — 3008F BODY MASS INDEX DOCD: CPT | Performed by: PSYCHIATRY & NEUROLOGY

## 2024-10-30 PROCEDURE — 99214 OFFICE O/P EST MOD 30 MIN: CPT | Performed by: PSYCHIATRY & NEUROLOGY

## 2024-10-30 PROCEDURE — 20526 THER INJECTION CARP TUNNEL: CPT | Performed by: PLASTIC SURGERY

## 2024-10-30 PROCEDURE — 3008F BODY MASS INDEX DOCD: CPT | Performed by: PLASTIC SURGERY

## 2024-10-30 PROCEDURE — 99203 OFFICE O/P NEW LOW 30 MIN: CPT | Performed by: PLASTIC SURGERY

## 2024-10-30 PROCEDURE — RXMED WILLOW AMBULATORY MEDICATION CHARGE

## 2024-10-30 PROCEDURE — 1036F TOBACCO NON-USER: CPT | Performed by: PLASTIC SURGERY

## 2024-10-30 PROCEDURE — 1036F TOBACCO NON-USER: CPT | Performed by: PSYCHIATRY & NEUROLOGY

## 2024-10-30 RX ORDER — TRIAMCINOLONE ACETONIDE 40 MG/ML
40 INJECTION, SUSPENSION INTRA-ARTICULAR; INTRAMUSCULAR ONCE
Status: COMPLETED | OUTPATIENT
Start: 2024-10-30 | End: 2024-10-30

## 2024-10-30 RX ORDER — TOPIRAMATE 25 MG/1
TABLET ORAL
Qty: 120 TABLET | Refills: 3 | Status: SHIPPED | OUTPATIENT
Start: 2024-10-30 | End: 2024-11-30

## 2024-10-30 ASSESSMENT — ENCOUNTER SYMPTOMS
SHORTNESS OF BREATH: 0
VOMITING: 0
UNEXPECTED WEIGHT CHANGE: 0
HALLUCINATIONS: 0
PALPITATIONS: 0
ADENOPATHY: 0
COUGH: 0
TROUBLE SWALLOWING: 0
FREQUENCY: 0
BRUISES/BLEEDS EASILY: 0
NAUSEA: 0
ARTHRALGIAS: 0
SEIZURES: 0
DIFFICULTY URINATING: 0
SINUS PRESSURE: 0
DIZZINESS: 0
BACK PAIN: 0
LIGHT-HEADEDNESS: 0
FEVER: 0
FEVER: 0
NUMBNESS: 0
ABDOMINAL PAIN: 0
FACIAL ASYMMETRY: 0
HEADACHES: 1
HYPERACTIVE: 0
VISUAL CHANGE: 1
CHILLS: 0
AGITATION: 0
WHEEZING: 0
CONFUSION: 0
JOINT SWELLING: 0
EYE PAIN: 0
SLEEP DISTURBANCE: 1
PHOTOPHOBIA: 0
TREMORS: 0
WEAKNESS: 0
SPEECH DIFFICULTY: 0
NECK PAIN: 0
NECK STIFFNESS: 0
FATIGUE: 0
UNEXPECTED WEIGHT CHANGE: 0

## 2024-10-30 ASSESSMENT — PATIENT HEALTH QUESTIONNAIRE - PHQ9
1. LITTLE INTEREST OR PLEASURE IN DOING THINGS: NOT AT ALL
2. FEELING DOWN, DEPRESSED OR HOPELESS: NOT AT ALL
SUM OF ALL RESPONSES TO PHQ9 QUESTIONS 1 & 2: 0

## 2024-10-30 ASSESSMENT — PAIN SCALES - GENERAL: PAINLEVEL_OUTOF10: 6

## 2024-10-31 ENCOUNTER — PHARMACY VISIT (OUTPATIENT)
Dept: PHARMACY | Facility: CLINIC | Age: 59
End: 2024-10-31
Payer: COMMERCIAL

## 2024-10-31 ENCOUNTER — TELEPHONE (OUTPATIENT)
Dept: NEUROLOGY | Facility: CLINIC | Age: 59
End: 2024-10-31
Payer: COMMERCIAL

## 2024-10-31 DIAGNOSIS — F41.9 ANXIETY: Primary | ICD-10-CM

## 2024-10-31 RX ORDER — LORAZEPAM 1 MG/1
1 TABLET ORAL ONCE
Qty: 2 TABLET | Refills: 0 | Status: SHIPPED | OUTPATIENT
Start: 2024-10-31 | End: 2024-11-01

## 2024-12-19 ENCOUNTER — HOSPITAL ENCOUNTER (OUTPATIENT)
Dept: NEUROLOGY | Facility: HOSPITAL | Age: 59
Discharge: HOME | End: 2024-12-19
Payer: COMMERCIAL

## 2024-12-19 DIAGNOSIS — G56.03 CARPAL TUNNEL SYNDROME ON BOTH SIDES: ICD-10-CM

## 2024-12-19 PROCEDURE — 95886 MUSC TEST DONE W/N TEST COMP: CPT

## 2024-12-23 DIAGNOSIS — N32.81 OAB (OVERACTIVE BLADDER): ICD-10-CM

## 2024-12-23 PROCEDURE — RXMED WILLOW AMBULATORY MEDICATION CHARGE

## 2024-12-23 RX ORDER — OXYBUTYNIN CHLORIDE 10 MG/1
20 TABLET, EXTENDED RELEASE ORAL DAILY
Qty: 180 TABLET | Refills: 1 | Status: SHIPPED | OUTPATIENT
Start: 2024-12-23 | End: 2025-06-21

## 2024-12-26 ENCOUNTER — PHARMACY VISIT (OUTPATIENT)
Dept: PHARMACY | Facility: CLINIC | Age: 59
End: 2024-12-26
Payer: COMMERCIAL

## 2024-12-31 ENCOUNTER — HOSPITAL ENCOUNTER (OUTPATIENT)
Dept: CARDIOLOGY | Facility: HOSPITAL | Age: 59
Discharge: HOME | End: 2024-12-31
Payer: COMMERCIAL

## 2024-12-31 DIAGNOSIS — Z95.0 CARDIAC PACEMAKER IN SITU: ICD-10-CM

## 2024-12-31 PROCEDURE — 93296 REM INTERROG EVL PM/IDS: CPT

## 2025-01-16 ENCOUNTER — TELEMEDICINE (OUTPATIENT)
Dept: PRIMARY CARE | Facility: CLINIC | Age: 60
End: 2025-01-16
Payer: COMMERCIAL

## 2025-01-16 DIAGNOSIS — A08.4 STOMACH FLU: Primary | ICD-10-CM

## 2025-01-16 DIAGNOSIS — R11.2 NAUSEA AND VOMITING, UNSPECIFIED VOMITING TYPE: ICD-10-CM

## 2025-01-16 DIAGNOSIS — A09 DIARRHEA OF INFECTIOUS ORIGIN: ICD-10-CM

## 2025-01-16 PROCEDURE — RXMED WILLOW AMBULATORY MEDICATION CHARGE

## 2025-01-16 PROCEDURE — 99213 OFFICE O/P EST LOW 20 MIN: CPT | Performed by: INTERNAL MEDICINE

## 2025-01-16 RX ORDER — ONDANSETRON 4 MG/1
4 TABLET, ORALLY DISINTEGRATING ORAL EVERY 8 HOURS PRN
Qty: 20 TABLET | Refills: 0 | Status: SHIPPED | OUTPATIENT
Start: 2025-01-16 | End: 2025-01-23

## 2025-01-16 ASSESSMENT — PATIENT HEALTH QUESTIONNAIRE - PHQ9
1. LITTLE INTEREST OR PLEASURE IN DOING THINGS: NOT AT ALL
2. FEELING DOWN, DEPRESSED OR HOPELESS: NOT AT ALL
SUM OF ALL RESPONSES TO PHQ9 QUESTIONS 1 AND 2: 0

## 2025-01-19 DIAGNOSIS — R11.2 NAUSEA AND VOMITING, UNSPECIFIED VOMITING TYPE: ICD-10-CM

## 2025-01-20 PROCEDURE — RXMED WILLOW AMBULATORY MEDICATION CHARGE

## 2025-01-21 ENCOUNTER — PHARMACY VISIT (OUTPATIENT)
Dept: PHARMACY | Facility: CLINIC | Age: 60
End: 2025-01-21
Payer: COMMERCIAL

## 2025-01-24 RX ORDER — PANTOPRAZOLE SODIUM 40 MG/1
40 TABLET, DELAYED RELEASE ORAL 2 TIMES DAILY
Qty: 60 TABLET | Refills: 1 | Status: SHIPPED | OUTPATIENT
Start: 2025-01-24 | End: 2025-03-25

## 2025-01-25 PROCEDURE — RXMED WILLOW AMBULATORY MEDICATION CHARGE

## 2025-01-30 ENCOUNTER — PHARMACY VISIT (OUTPATIENT)
Dept: PHARMACY | Facility: CLINIC | Age: 60
End: 2025-01-30
Payer: COMMERCIAL

## 2025-02-19 ENCOUNTER — APPOINTMENT (OUTPATIENT)
Dept: NEUROLOGY | Facility: CLINIC | Age: 60
End: 2025-02-19
Payer: COMMERCIAL

## 2025-03-13 ENCOUNTER — HOSPITAL ENCOUNTER (OUTPATIENT)
Dept: RADIOLOGY | Facility: CLINIC | Age: 60
Discharge: HOME | End: 2025-03-13
Payer: COMMERCIAL

## 2025-03-13 VITALS — WEIGHT: 213.6 LBS | HEIGHT: 70 IN | BODY MASS INDEX: 30.58 KG/M2

## 2025-03-13 DIAGNOSIS — Z12.31 ENCOUNTER FOR SCREENING MAMMOGRAM FOR BREAST CANCER: ICD-10-CM

## 2025-03-13 PROCEDURE — 77067 SCR MAMMO BI INCL CAD: CPT | Performed by: STUDENT IN AN ORGANIZED HEALTH CARE EDUCATION/TRAINING PROGRAM

## 2025-03-13 PROCEDURE — 77063 BREAST TOMOSYNTHESIS BI: CPT | Performed by: STUDENT IN AN ORGANIZED HEALTH CARE EDUCATION/TRAINING PROGRAM

## 2025-03-13 PROCEDURE — 77067 SCR MAMMO BI INCL CAD: CPT

## 2025-03-16 NOTE — PROGRESS NOTES
Yarelis Brantley, pleasant 60 y.o. female was seen today for:      Chief Complaint   Patient presents with    Nausea     No sense of taste    Diarrhea      not eating and can only keep down water.     Virtual or Telephone Consent    An interactive audio and video telecommunication system which permits real time communications between the patient (at the originating site) and provider (at the distant site) was utilized to provide this telehealth service.  Verbal consent was requested and obtained from Yarelis Brantley on this date, 03/16/25 for a telehealth visit and the patient's location was confirmed at the time of the visit.          Yarelis Brantley   Was on the phone conference with me today.  She has not been feeling well.  She is having diarrhea and also nausea vomiting.  Patient was told about BRAT diet.  She should get something soft.  Nobody else is sick in the family.  She possibly has some GI flu most likely norovirus.  She can have her stool testing if it continues and is loose.  However patient probably would not oral rehydration therapy will get better.  I do not want to give her any antibiotic at this time.  Patient understood.  Let the germ come out.  Meanwhile she needs supportive therapy.  Patient agrees with the plan        MEDICAL DECISION MAKING:  - Current co morbidities have been considered.   - All pertinent labs and images were addressed as per medical necessity.    - Also reviewed relevant notes from the specialty consultants.     - Time spent before, during and after office visit, which includes coordination of care counseling was 32 minutes  - Next follow up : 7 to 10 days if not better with PCP    TODAY'S VISIT  DX:     1. Stomach flu  Symptomatic treatment.  Patient was told to do BRAT next 24-48 hrs.       2. Nausea and vomiting, unspecified vomiting type  ondansetron ODT (Zofran-ODT) 4 mg disintegrating tablet      3. Diarrhea of infectious origin  Patient was encouraged oral rehydration  therapy specially Gatorade         Visit Vitals  OB Status Postmenopausal   Smoking Status Former     Wt Readings from Last 10 Encounters:   03/13/25 96.9 kg (213 lb 9.6 oz)   10/30/24 96.9 kg (213 lb 9.6 oz)   10/30/24 94.3 kg (208 lb)   09/17/24 94.3 kg (208 lb)   08/02/24 91.9 kg (202 lb 9.6 oz)   07/01/24 93.6 kg (206 lb 6.4 oz)   06/03/24 93.9 kg (207 lb)   03/15/24 93 kg (205 lb)   03/09/24 99.8 kg (220 lb)   03/08/24 99.8 kg (220 lb)       MEDICATIONS:   Current Outpatient Medications   Medication Instructions    amoxicillin (Amoxil) 875 mg tablet TAKE ONE TABLET BY MOUTH TWICE DAILY UNTIL FINISHED    aspirin 81 mg, Daily    diazePAM (Valium) 10 mg tablet TAKE ONE TO ONE-HALF HOUR TABLET prior TO appointments    melatonin 10 mg tablet,ext release multiphase 1 tablet, Nightly    metoprolol tartrate (LOPRESSOR) 25 mg, oral, 2 times daily    oxybutynin XL (DITROPAN XL) 20 mg, oral, Daily, Do not crush, chew, or split.    pantoprazole (PROTONIX) 40 mg, oral, 2 times daily, Do not crush, chew, or split.    sertraline (ZOLOFT) 100 mg, oral, Daily    simvastatin (ZOCOR) 20 mg, oral, Nightly       Review of Systems   Constitutional:  Negative for activity change and fever.   HENT:  Negative for hearing loss, nosebleeds and tinnitus.    Eyes:  Negative for redness.   Respiratory:  Negative for chest tightness and stridor.    Cardiovascular:  Negative for chest pain, palpitations and leg swelling.   Gastrointestinal:  Negative for blood in stool.   Endocrine: Negative for cold intolerance.   Genitourinary:  Negative for hematuria.   Musculoskeletal:  Negative for joint swelling.   Skin:  Negative for rash.   Neurological:  Negative for speech difficulty and light-headedness.   Psychiatric/Behavioral:  Negative for behavioral problems.         RECENT LABS:  Lab Results   Component Value Date    WBC 7.8 03/09/2024    HGB 14.0 03/09/2024    HCT 42.0 03/09/2024     03/09/2024    CHOL 189 01/16/2023    TRIG 99  01/16/2023    HDL 47.4 01/16/2023    ALT 16 03/09/2024    AST 21 03/09/2024     06/26/2024    K 4.3 06/26/2024     06/26/2024    CREATININE 0.86 06/26/2024    BUN 15 06/26/2024    CO2 25 06/26/2024    TSH 1.40 06/04/2024    INR 1.1 01/15/2023    HGBA1C 5.4 07/01/2024     Lab Results   Component Value Date    GLUCOSE 119 (H) 06/26/2024    CALCIUM 9.0 06/26/2024     06/26/2024    K 4.3 06/26/2024    CO2 25 06/26/2024     06/26/2024    BUN 15 06/26/2024    CREATININE 0.86 06/26/2024     Lab Results   Component Value Date    HGBA1C 5.4 07/01/2024    HGBA1C 5.4 01/16/2023     Lab Results   Component Value Date    CREATININE 0.86 06/26/2024         P.S: This note was completed using Dragon voice recognition technology and may include unintended errors with respect to translation of words, typographical errors or grammar errors which may not have been identified while finalizing the chart.

## 2025-03-17 ASSESSMENT — PROMIS GLOBAL HEALTH SCALE
CARRYOUT_PHYSICAL_ACTIVITIES: MODERATELY
CARRYOUT_SOCIAL_ACTIVITIES: GOOD
RATE_MENTAL_HEALTH: GOOD
RATE_PHYSICAL_HEALTH: FAIR
RATE_GENERAL_HEALTH: GOOD
RATE_AVERAGE_FATIGUE: MODERATE
EMOTIONAL_PROBLEMS: SOMETIMES
RATE_AVERAGE_PAIN: 4
RATE_SOCIAL_SATISFACTION: FAIR
RATE_QUALITY_OF_LIFE: GOOD

## 2025-03-24 ENCOUNTER — APPOINTMENT (OUTPATIENT)
Dept: PRIMARY CARE | Facility: CLINIC | Age: 60
End: 2025-03-24
Payer: COMMERCIAL

## 2025-03-24 DIAGNOSIS — Z76.0 ENCOUNTER FOR MEDICATION REFILL: ICD-10-CM

## 2025-03-24 DIAGNOSIS — N32.81 OAB (OVERACTIVE BLADDER): ICD-10-CM

## 2025-03-24 DIAGNOSIS — E78.2 MIXED HYPERLIPIDEMIA: ICD-10-CM

## 2025-03-24 DIAGNOSIS — F41.9 ANXIETY: ICD-10-CM

## 2025-03-24 DIAGNOSIS — R11.2 NAUSEA AND VOMITING, UNSPECIFIED VOMITING TYPE: ICD-10-CM

## 2025-03-24 DIAGNOSIS — K21.9 GASTROESOPHAGEAL REFLUX DISEASE, UNSPECIFIED WHETHER ESOPHAGITIS PRESENT: Primary | ICD-10-CM

## 2025-03-24 PROCEDURE — RXMED WILLOW AMBULATORY MEDICATION CHARGE

## 2025-03-24 PROCEDURE — 99213 OFFICE O/P EST LOW 20 MIN: CPT | Performed by: FAMILY MEDICINE

## 2025-03-24 PROCEDURE — 1036F TOBACCO NON-USER: CPT | Performed by: FAMILY MEDICINE

## 2025-03-24 RX ORDER — SIMVASTATIN 20 MG/1
20 TABLET, FILM COATED ORAL NIGHTLY
Qty: 90 TABLET | Refills: 3 | Status: SHIPPED | OUTPATIENT
Start: 2025-03-24 | End: 2026-03-24

## 2025-03-24 RX ORDER — SERTRALINE HYDROCHLORIDE 100 MG/1
100 TABLET, FILM COATED ORAL DAILY
Qty: 90 TABLET | Refills: 1 | Status: SHIPPED | OUTPATIENT
Start: 2025-03-24

## 2025-03-24 RX ORDER — PANTOPRAZOLE SODIUM 40 MG/1
40 TABLET, DELAYED RELEASE ORAL 2 TIMES DAILY
Qty: 60 TABLET | Refills: 1 | Status: SHIPPED | OUTPATIENT
Start: 2025-03-24 | End: 2025-05-23

## 2025-03-24 RX ORDER — OXYBUTYNIN CHLORIDE 10 MG/1
20 TABLET, EXTENDED RELEASE ORAL DAILY
Qty: 180 TABLET | Refills: 1 | Status: SHIPPED | OUTPATIENT
Start: 2025-03-24 | End: 2025-03-28 | Stop reason: ALTCHOICE

## 2025-03-24 NOTE — PROGRESS NOTES
"Subjective   Chief complaint: Yarelis Brantley \"Thu\" is a 60 y.o. female who presents for Check GERD, Med Refill.    HPI:  Yarelis is a pleasant 60-year-old patient who is having a virtual appointment today with a complaint of very bad heartburn.  Virtual or Telephone Consent    An interactive audio and video telecommunication system which permits real time communications between the patient (at the originating site) and provider (at the distant site) was utilized to provide this telehealth service.   Verbal consent was requested and obtained from Yarelis Brantley on this date, 03/24/25 for a telehealth visit and the patient's location was confirmed at the time of the visit.  Yarelis reports that she has been getting worsening heartburn.  She is on omeprazole 40 mg daily and is getting more frequent heartburn and it is now waking her up at night which has happened a couple times.  She is taking Mylanta over-the-counter as well as Tums.  Symptoms are still worsening.  She also needs refills on medications.  She will be seeing cardiology in April.  Had an episode of bad heartburn that had her think she was having a heart attack.  She did not go to the emergency department.  No further episodes.          Objective   There were no vitals taken for this visit.  Physical Exam  General:  Alert, oriented, no acute distress, pleasant.  Eyes:  Sclerae appear white on Dictation #1  MRN:[id number]  CSN:[id number] video.  ENT:  No nasal congestion.    Neck: Appears supple  Respiratory:  No dyspnea.  Vascular:  No edema.  Skin warm and dry.  CNS:  No gross neurological deficits.  Gait within normal limits.    Psychiatric:  Affect is positive and appropriate.  No depression.  No anxiety.    Review of Systems   I have reviewed and reconciled the medication list with the patient today.   Current Outpatient Medications:     aspirin 81 mg EC tablet, Take 1 tablet (81 mg) by mouth once daily., Disp: , Rfl:     diazePAM (Valium) 10 mg tablet, " Prior to DENTAL APPTS., Disp: , Rfl:     melatonin 10 mg tablet,ext release multiphase, Take 1 tablet by mouth once daily at bedtime., Disp: , Rfl:     metoprolol tartrate (Lopressor) 25 mg tablet, Take 1 tablet (25 mg) by mouth 2 times a day., Disp: 180 tablet, Rfl: 3    oxybutynin XL (Ditropan XL) 10 mg 24 hr tablet, Take 2 tablets (20 mg) by mouth once daily. Do not crush, chew, or split., Disp: 180 tablet, Rfl: 1    pantoprazole (ProtoNix) 40 mg EC tablet, Take 1 tablet (40 mg) by mouth 2 times a day. Do not crush, chew, or split., Disp: 60 tablet, Rfl: 1    sertraline (Zoloft) 100 mg tablet, Take 1 tablet (100 mg) by mouth once daily., Disp: 90 tablet, Rfl: 1    simvastatin (Zocor) 20 mg tablet, Take 1 tablet (20 mg) by mouth once daily at bedtime., Disp: 90 tablet, Rfl: 3     Imaging:  BI mammo bilateral screening tomosynthesis    Result Date: 3/16/2025  Interpreted By:  Stiven Mejia, STUDY: BI MAMMO BILATERAL SCREENING TOMOSYNTHESIS;  3/13/2025 10:55 am   ACCESSION NUMBER(S): [id number]   ORDERING CLINICIAN: GRIFFIN NAVA   INDICATION: Screening. Benign right excisional biopsy.   ,Z12.31 Encounter for screening mammogram for malignant neoplasm of breast   COMPARISON: 01/26/2022 and 12/13/2018   FINDINGS: 2D and tomosynthesis images were reviewed at 1 mm slice thickness.   Density: There are scattered areas of fibroglandular density.   A left-sided AICD is again identified limiting evaluation of the left axilla. No suspicious masses or calcifications are identified bilaterally.       No mammographic evidence of malignancy.   BI-RADS CATEGORY:   BI-RADS Category:  1 Negative. Recommendation:  Annual Screening. Recommended Date:  1 Year. Laterality:  Bilateral.       For any future breast imaging appointments, please call [phone]).   MACRO: None   Signed by: Stiven Mejia 3/16/2025 6:39 AM Dictation workstation:   [id number]       Labs reviewed:    Lab Results   Component Value  Date    WBC 7.8 03/09/2024    HGB 14.0 03/09/2024    HCT 42.0 03/09/2024     03/09/2024    CHOL 189 01/16/2023    TRIG 99 01/16/2023    HDL 47.4 01/16/2023    ALT 16 03/09/2024    AST 21 03/09/2024     06/26/2024    K 4.3 06/26/2024     06/26/2024    CREATININE 0.86 06/26/2024    BUN 15 06/26/2024    CO2 25 06/26/2024    TSH 1.40 06/04/2024    INR 1.1 01/15/2023    HGBA1C 5.4 07/01/2024       Assessment/Plan   Problem List Items Addressed This Visit       Anxiety    Relevant Medications    sertraline (Zoloft) 100 mg tablet    Encounter for medication refill     Medications reviewed.  Refills given.         Gastroesophageal reflux disease - Primary     At this time,  will increase her omeprazole 40 mg to twice a day.    GI referral given.           Relevant Orders    Referral to Gastroenterology    Nausea and vomiting    Relevant Medications    pantoprazole (ProtoNix) 40 mg EC tablet     Other Visit Diagnoses       OAB (overactive bladder)        Relevant Medications    oxybutynin XL (Ditropan XL) 10 mg 24 hr tablet    Mixed hyperlipidemia        Relevant Medications    simvastatin (Zocor) 20 mg tablet            Continue current medications as listed  Follow up with cardiology as scheduled.  If another episode of chest pain, go to the ED for immediate evaluation.  Patient voiced understanding and is agreeable to the plan.  GI referral given.

## 2025-03-27 ENCOUNTER — PHARMACY VISIT (OUTPATIENT)
Dept: PHARMACY | Facility: CLINIC | Age: 60
End: 2025-03-27
Payer: COMMERCIAL

## 2025-03-28 ENCOUNTER — APPOINTMENT (OUTPATIENT)
Dept: PRIMARY CARE | Facility: CLINIC | Age: 60
End: 2025-03-28
Payer: COMMERCIAL

## 2025-03-28 ENCOUNTER — TELEMEDICINE (OUTPATIENT)
Dept: UROLOGY | Facility: CLINIC | Age: 60
End: 2025-03-28
Payer: COMMERCIAL

## 2025-03-28 ENCOUNTER — APPOINTMENT (OUTPATIENT)
Dept: UROLOGY | Facility: CLINIC | Age: 60
End: 2025-03-28
Payer: COMMERCIAL

## 2025-03-28 DIAGNOSIS — N32.81 OAB (OVERACTIVE BLADDER): Primary | ICD-10-CM

## 2025-03-28 DIAGNOSIS — N30.10 INTERSTITIAL CYSTITIS: ICD-10-CM

## 2025-03-28 PROCEDURE — 99213 OFFICE O/P EST LOW 20 MIN: CPT | Performed by: NURSE PRACTITIONER

## 2025-03-28 RX ORDER — METHENAMINE, SODIUM PHOSPHATE, MONOBASIC, MONOHYDRATE, PHENYL SALICYLATE, METHYLENE BLUE, AND HYOSCYAMINE SULFATE 118; 40.8; 36; 10; .12 MG/1; MG/1; MG/1; MG/1; MG/1
1 CAPSULE ORAL EVERY 6 HOURS PRN
Start: 2025-03-28

## 2025-03-28 RX ORDER — OXYBUTYNIN CHLORIDE 10 MG/1
20 TABLET, EXTENDED RELEASE ORAL DAILY
Qty: 60 TABLET | Refills: 1 | Status: SHIPPED | OUTPATIENT
Start: 2025-03-28 | End: 2025-05-27

## 2025-03-28 NOTE — PROGRESS NOTES
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 She is here for follow up device check of PPM.    Pacemaker device check today.  Medtronic A2DR 01 Advisa DR NICHOLS.  Remaining battery longevity is 8 months.  RA pacing 88.58%, RV pacing 19.84%.  7 AT/AF episodes less than 1 minute with less than 1% burden.  She has a Watchman device.  0 ventricular detections.  Her next scheduled remote check is July 7, 2025 and battery status will be checked monthly after that.    Pt says 2 episodes in the last 3 months with chest discomfort that woke her up from sleep. She waited it out and it resolved. She has a follow up with GI since her last EGD did show moderate edematous and erythematous mucosa in the stomach, consistent with gastritis; along with grade A esophagitis with mucosal breaks measuring less than 5 mm not continuous between folds, covering less than 75% of the circumference.  She is on PPI BID.    Further inquiry of functional status reveals that she can only walk up about 6 steps before she is winded and she needs rest. She admits to being very sedentary. She can walk from a parking lot to a building without symptoms but it seems with further exertion she is limited. She did eleazar a normal echo on 3/8/2024 w/ NL LVF, EF 55-60%. I recommend follow up with cardiology for a pharmacological nuclear stress test.     Today's EKG Interpretation: Atrial paced underlying sinus rhythm, rate 72bpm, IRBBB, qrs 106ms, qtc 413 ms    VITALS  Vitals:    04/01/25 0821   BP: 112/78   Pulse: 72     Wt Readings from Last 4 Encounters:   04/01/25 98.2 kg (216 lb 8 oz)   03/13/25 96.9 kg (213 lb 9.6 oz)   10/30/24 96.9 kg (213 lb 9.6 oz)   10/30/24 94.3 kg (208 lb)       PHYSICAL EXAM:  GENERAL:  Well developed, well nourished, in no acute distress.  NEURO/PSYCH:  No focal deficits. A&O x 3   LUNGS:  Clear to auscultation bilaterally. No wheezing, rhonci, or crackles  HEART:  RRR, no M/R/G.   EXTREMITIES:  Warm with good color, no clubbing or cyanosis.  No pitting edema.     Past Medical History:   Diagnosis Date    Anxiety     COVID-19     VACCINATED    Diarrhea     Dorsalgia, unspecified     Disabling back pain    Encounter for follow-up examination after completed treatment for conditions other than malignant neoplasm 12/14/2021    Encounter for examination following treatment at hospital    Encounter for other administrative examinations 12/14/2021    Encounter for completion of form with patient    Epigastric abdominal tenderness 12/14/2021    Epigastric abdominal tenderness    Fever, unspecified 12/10/2021    Low grade fever    GERD (gastroesophageal reflux disease)     Hyperlipidemia     Numbness     Other conditions influencing health status 12/10/2021    History of cough    Other disturbances of smell and taste 12/10/2021    COVID-19 long hauler manifesting chronic loss of smell and taste    Personal  history of other diseases of the circulatory system 03/21/2017    History of essential hypertension    Personal history of other diseases of the circulatory system 03/21/2017    History of atrial fibrillation    Personal history of other diseases of the circulatory system     History of paroxysmal supraventricular tachycardia    Personal history of other diseases of the digestive system 03/21/2017    History of esophageal reflux    Personal history of other diseases of the digestive system 01/19/2022    History of chronic constipation    Personal history of other diseases of the digestive system     History of esophageal spasm    Personal history of other diseases of the digestive system     History of gastroesophageal reflux (GERD)    Personal history of other diseases of the musculoskeletal system and connective tissue 12/20/2017    History of low back pain    Personal history of other endocrine, nutritional and metabolic disease 03/21/2017    History of hyperlipidemia    Personal history of other medical treatment     History of screening mammography    Personal history of other mental and behavioral disorders 03/21/2017    History of depression    Personal history of other mental and behavioral disorders     History of depression    Personal history of other specified conditions 01/20/2022    History of diarrhea    Personal history of other specified conditions 01/20/2022    History of nausea and vomiting    Personal history of other specified conditions 01/20/2022    History of epigastric pain    Personal history of other specified conditions 07/15/2021    History of dysphagia    Personal history of other specified conditions 12/14/2021    History of abdominal pain    Personal history of other specified conditions     History of chest pain    Personal history of other specified conditions     History of syncope    Personal history of other specified conditions     History of vomiting    Personal history of  other specified conditions 2019    History of urinary frequency    Personal history of urinary (tract) infections 2019    History of urinary tract infection    Restless leg syndrome     RLS (restless legs syndrome)     Shingles     Shortness of breath     SOBOE (shortness of breath on exertion)    Stroke (Multi)     Syncope     TIA (transient ischemic attack)     Urge incontinence 2020    Urge incontinence    Vision loss      Past Surgical History:   Procedure Laterality Date    APPENDECTOMY  2017    Appendectomy    BREAST BIOPSY Right 2005    3/10/2006    CARDIAC CATHETERIZATION N/A 2024    Procedure: LAAO (Left Atrial Appendage Occlusion);  Surgeon: Avtar Rosas MD;  Location: [facility] Cardiac Cath Lab;  Service: Cardiovascular;  Laterality: N/A;  SD CT at 0815/[employer]    CARDIAC CATHETERIZATION N/A 2024    Procedure: LAAO (Left Atrial Appendage Occlusion);  Surgeon: Avtar Rosas MD;  Location: [facility] Cardiac Cath Lab;  Service: Cardiovascular;  Laterality: N/A;  CT Completed 3/1    CARDIAC PACEMAKER PLACEMENT  2017    Pacemaker Placement     SECTION, CLASSIC  2017     Section    CHOLECYSTECTOMY  2017    Cholecystectomy    COLONOSCOPY  2017    Complete Colonoscopy    CT ANGIO NECK  01/15/2023    CT NECK ANGIO W AND WO IV CONTRAST 1/15/2023 DOCTOR OFFICE LEGAC[facility]    CT HEAD ANGIO W AND WO IV CONTRAST  01/15/2023    CT HEAD ANGIO W AND WO IV CONTRAST 1/15/2023 DOCTOR OFFICE LEGACY    HYSTERECTOMY  1996    Hysterectomy    MR HEAD ANGIO WO IV CONTRAST  2023    MR HEAD ANGIO WO IV CONTRAST 2023 DOCTOR OFFICE LEGACY    MR NECK ANGIO WO IV CONTRAST  2023    MR NECK ANGIO WO IV CONTRAST 2023 DOCTOR OFFICE LEGACY    OTHER SURGICAL HISTORY  10/09/2021    Catheter ablation     Social History     Tobacco Use    Smoking status: Former     Current packs/day: 1.00     Average packs/day: 0.7 packs/day for 47.2  years (32.2 ttl pk-yrs)     Types: Cigarettes     Start date: 1978     Passive exposure: Past    Smokeless tobacco: Never   Vaping Use    Vaping status: Never Used   Substance Use Topics    Alcohol use: Not Currently     Comment: Maybe glass once every couple months    Drug use: Never     Allergies   Allergen Reactions    Prochlorperazine Rash and Nausea/vomiting     vomiting      Current Outpatient Medications   Medication Instructions    aspirin 81 mg, Daily    diazePAM (Valium) 10 mg tablet Prior to DENTAL APPTS.    melatonin 10 mg tablet,ext release multiphase 1 tablet, Nightly    methvinny-m.blue-s.phos-phsal-hyo (UribeL) 118-10-40.8-36 mg capsule 1 capsule, oral, Every 6 hours PRN    metoprolol tartrate (LOPRESSOR) 25 mg, oral, 2 times daily    oxybutynin XL (DITROPAN XL) 20 mg, oral, Daily, Do not crush, chew, or split.    pantoprazole (PROTONIX) 40 mg, oral, 2 times daily, Do not crush, chew, or split.    sertraline (ZOLOFT) 100 mg, oral, Daily    simvastatin (ZOCOR) 20 mg, oral, Nightly      Relevant reports:   3/8/2024 Echo  NL LVF, EF 55-60%     1/17/2023 ECHO  NL LVF, EF 60-65%   Spectral Doppler shows an impaired relaxation pattern of left ventricular diastolic filling.  Negative bubble study.    2/18/2009 Heart Cath  Normal coronaries and LV function    6/14/2002 Heart Cath  Normal coronaries and LV function    12/07/2002 EP study: RFA of HIS slow AV benoit pathway for AV benoit re-entry tachycardia. RFA of ectopic atrial tachycardia. No evidence of accessory bypass tract, Normal HIS Purkinje physiology, AF during isuprel provocation    ASSESSMENT AND PLAN  Problem List Items Addressed This Visit       Paroxsymal Atrial fibrillation (Multi)-  Today's EKG noted A-paced with underlying sinus rhythm. Refilled metoprolol tartrate  She has a Watchman device. No OAC required.     Relevant Medications    metoprolol tartrate (Lopressor) 25 mg tablet    Cardiac pacemaker in situ - Primary- Longevity is about 8  months. Her next scheduled remote check is July 7, 2025 and battery status will be checked monthly after that.    Relevant Orders    ECG 12 lead (Clinic Performed)    Cardiac Device Check - Remote    Cardiac Device Check - In Clinic    Cardiac Device Check - In Clinic    Sinus node dysfunction (Multi)- PPM every 3 months rotating between remote and in-clinic. Next in-clinic will be with Dr Zuniga in 6 months.     Relevant Medications    metoprolol tartrate (Lopressor) 25 mg tablet    Chest pain- DDX cardiac vs GI  Pt says 2 episodes in the last 3 months with chest discomfort that woke her up from sleep. She waited it out and it resolved. She has a follow up with GI since her last EGD did show moderate edematous and erythematous mucosa in the stomach, consistent with gastritis; along with grade A esophagitis with mucosal breaks measuring less than 5 mm not continuous between folds, covering less than 75% of the circumference. She is on PPI BID.    Further inquiry of functional status reveals that she can only walk up about 6-8 steps before she is winded and she needs rest. She admits to being very sedentary. She can walk from a parking lot to a building without symptoms but it seems with further exertion she is limited. She did eleazar a normal echo on 3/8/2024 w/ NL LVF, EF 55-60%. I recommend follow up with cardiology for a pharmacological nuclear stress test.         EMILY Cedeno, PAOmidC

## 2025-03-28 NOTE — PROGRESS NOTES
"Subjective   Patient ID: Yarelis Brantley \"Wellington" is a 60 y.o. female who presents for No chief complaint on file..  Presents for f/u of interstitial cystitis. She takes oxybutynin XL 15 mg daily. She occasionally has flares of her symptoms for which she takes Uribel. She denies any fever, chills, or dysuria.      Patient with most recent flare of IC in Dec 2019. Treated with 2 rounds of installation therapy. She takes initially her diagnosis was confirmed on cysto 2015. She underwent cysto with Dr. Llanos in Dec 2019 which did not identify any bladder abnormalities.      Developing some recurrence of dysuria and increase in urgency over the last few months. Patient has been on oxybutynin 20mg daily. She is very bothered by increase in spasms over the last few months. Frequent UTI symptoms in the absence of infection.           Review of Systems   All other systems reviewed and are negative.      Objective   Physical Exam  ** UNABLE TO CONNECT VIA THV**    Assessment/Plan   Diagnoses and all orders for this visit:  OAB (overactive bladder)  -     Referral to Clinical Pharmacy; Future  -     methen-m.blue-s.phos-phsal-hyo (UribeL) 118-10-40.8-36 mg capsule; Take 1 capsule by mouth every 6 hours if needed (dysuria).  -     oxybutynin XL (Ditropan XL) 10 mg 24 hr tablet; Take 2 tablets (20 mg) by mouth once daily. Do not crush, chew, or split.  Interstitial cystitis  -     Referral to Clinical Pharmacy; Future  -     methen-m.blue-s.phos-phsal-hyo (UribeL) 118-10-40.8-36 mg capsule; Take 1 capsule by mouth every 6 hours if needed (dysuria).    Worsening OAB and IC symptoms over the last few months. Plan to switch to gemtesa. Will attempt to obtain via  pharmacy assistance. Follow up in a few months or sooner if needed. Patient verbalized understanding of plan.        Pilar Arredondo, RANJIT-CNP 03/28/25 10:49 AM   " TRANSFER

## 2025-04-01 ENCOUNTER — APPOINTMENT (OUTPATIENT)
Dept: CARDIOLOGY | Facility: CLINIC | Age: 60
End: 2025-04-01
Payer: COMMERCIAL

## 2025-04-01 ENCOUNTER — OFFICE VISIT (OUTPATIENT)
Dept: CARDIOLOGY | Facility: CLINIC | Age: 60
End: 2025-04-01
Payer: COMMERCIAL

## 2025-04-01 ENCOUNTER — HOSPITAL ENCOUNTER (OUTPATIENT)
Dept: CARDIOLOGY | Facility: HOSPITAL | Age: 60
Discharge: HOME | End: 2025-04-01
Payer: COMMERCIAL

## 2025-04-01 VITALS
HEIGHT: 70 IN | DIASTOLIC BLOOD PRESSURE: 78 MMHG | HEART RATE: 72 BPM | SYSTOLIC BLOOD PRESSURE: 112 MMHG | BODY MASS INDEX: 30.99 KG/M2 | WEIGHT: 216.5 LBS

## 2025-04-01 DIAGNOSIS — Z95.0 CARDIAC PACEMAKER IN SITU: ICD-10-CM

## 2025-04-01 DIAGNOSIS — Z95.0 CARDIAC PACEMAKER IN SITU: Primary | ICD-10-CM

## 2025-04-01 DIAGNOSIS — I49.5 SINUS NODE DYSFUNCTION (MULTI): ICD-10-CM

## 2025-04-01 DIAGNOSIS — I48.0 PAROXYSMAL ATRIAL FIBRILLATION (MULTI): ICD-10-CM

## 2025-04-01 DIAGNOSIS — R07.9 CHEST PAIN, UNSPECIFIED TYPE: ICD-10-CM

## 2025-04-01 PROCEDURE — 93280 PM DEVICE PROGR EVAL DUAL: CPT

## 2025-04-01 PROCEDURE — 3008F BODY MASS INDEX DOCD: CPT | Performed by: PHYSICIAN ASSISTANT

## 2025-04-01 PROCEDURE — 99214 OFFICE O/P EST MOD 30 MIN: CPT | Performed by: PHYSICIAN ASSISTANT

## 2025-04-01 PROCEDURE — 93000 ELECTROCARDIOGRAM COMPLETE: CPT | Mod: DISTINCT PROCEDURAL SERVICE | Performed by: INTERNAL MEDICINE

## 2025-04-01 PROCEDURE — 1036F TOBACCO NON-USER: CPT | Performed by: PHYSICIAN ASSISTANT

## 2025-04-01 PROCEDURE — RXMED WILLOW AMBULATORY MEDICATION CHARGE

## 2025-04-01 RX ORDER — METOPROLOL TARTRATE 25 MG/1
25 TABLET, FILM COATED ORAL 2 TIMES DAILY
Qty: 180 TABLET | Refills: 3 | Status: SHIPPED | OUTPATIENT
Start: 2025-04-01 | End: 2026-04-01

## 2025-04-01 NOTE — PATIENT INSTRUCTIONS
Great to see you today.   Please take your medications and or do life style behavior modifications as discussed.   Please make appointment in 6 months with Dr Zuniga  Please call if you have any questions or concerns.  Please go to emergency department if you have abrupt onset of chest, shortness of breath, light headedness or dizziness.

## 2025-04-04 ENCOUNTER — APPOINTMENT (OUTPATIENT)
Dept: CARDIOLOGY | Facility: HOSPITAL | Age: 60
End: 2025-04-04
Payer: COMMERCIAL

## 2025-04-04 ENCOUNTER — APPOINTMENT (OUTPATIENT)
Dept: CARDIOLOGY | Facility: CLINIC | Age: 60
End: 2025-04-04
Payer: COMMERCIAL

## 2025-04-04 ENCOUNTER — PHARMACY VISIT (OUTPATIENT)
Dept: PHARMACY | Facility: CLINIC | Age: 60
End: 2025-04-04
Payer: COMMERCIAL

## 2025-04-22 NOTE — PROGRESS NOTES
"  Patient ID: Yarelis Brantley \"Thu\" is a 60 y.o. female who presents for No chief complaint on file..    Pt is here for First appointment.     Referring Provider: Pilar Arredondo AP*  Reason for Referral: cost assistance    Subjective     Medication and allergy reconciliation completed     Drug Interactions  No relevant drug interactions were noted.    Medication System Management  Patient's preferred pharmacy:     [facility]  [address]  [place] [address]  Phone: [phone] Fax: [phone]    Adherence/Organization: no concerns  Affordability/Accessibility: Assess for [facility]      Medications Ordered Prior to Encounter[1]      Urinary Symptoms  Medications that may contribute to symptoms:   diuretics, anticholinergics, alpha blockers (doxazosin, prazosin, terazosin), tricyclic antidepressants, antipsychotics (lithium, clozapine), calcium channel blockers (causes bladder to relax and affects ability to empty properly), opioids (impair bladder contraction), antihistamines (diphenhydramine, chlorpheniramine), pseudoephedrine, phenylephrine, SGLT2 inhibitors (increases the amount of glucose and fluid excreted through kidneys).   Any history of:   Narrow-angle glaucoma: No  Impaired gastric emptying:No  Urinary retention: No  If yes to any of the above use caution/avoid antimuscarinic medications  Prediabetes or diabetes:  No  Lab Results   Component Value Date    GLUCOSE 119 (H) 06/26/2024    HGBA1C 5.4 07/01/2024    HGBA1C 5.4 01/16/2023     No results found for: \"LEPTIN\", \"INSULFAST\", \"GLUF\"  Frequently of bowel movement: 1x/day sometimes every other day  Tobacco use: No  How many protective undergarments are you utilizing daily: wears protective underwear daily, experiences daily incontinence. If she has the urge to go it comes out right away, feels very little control over her bladder.     What medications have been tried/stopped?   Oxybutynin- not working  Current medication? " "Oxybutynin  When started?   Side effects? no  Improvement in symptoms? Worsening of symptoms      Cardiovascular Health  The ASCVD Risk score (Lucio HUBER, et al., 2019) failed to calculate for the following reasons:    Risk score cannot be calculated because patient has a medical history suggesting prior/existing ASCVD    Lab Results   Component Value Date    CHOL 189 01/16/2023     Lab Results   Component Value Date    HDL 47.4 01/16/2023     No results found for: \"LDLCALC\"  Lab Results   Component Value Date    TRIG 99 01/16/2023     No components found for: \"CHOLHDL\"     Vitals  BP Readings from Last 2 Encounters:   04/01/25 112/78   10/30/24 110/76     BMI Readings from Last 1 Encounters:   04/01/25 31.06 kg/m²        BMP  Lab Results   Component Value Date    CALCIUM 9.0 06/26/2024     06/26/2024    K 4.3 06/26/2024    CO2 25 06/26/2024     06/26/2024    BUN 15 06/26/2024    CREATININE 0.86 06/26/2024    EGFR 77 06/26/2024     Make sure GFR >15 ml/min or dose adjust    LFTs  Lab Results   Component Value Date    ALT 16 03/09/2024    AST 21 03/09/2024    ALKPHOS 79 03/09/2024    BILITOT 0.7 03/09/2024         Assessment/Plan     Patient is experiencing urinary frequency, urgency, and daily incontinence. Bladder symptoms have worsened over the past 2-3 months. Having bladder spasms that feel like a \"heart beat\" and feels like she has an \"on going UTI.\"   Has tried before:   Oxybutynin: not working  Anticholinergic medications are not appropriate due to concern for cognitive impairment.   Patient has a pace maker - myrbetriq is not an appropriate option due to risk of blood pressure elevation.   GEMTESA  Hasn't tried samples before  MOA: works by activating beta-3 adrenergic receptors in the bladder resulting in relaxation of the detrusor smooth muscle during the urine storage phase, thus increasing bladder capacity.  Potential side effects including but not limited to hypertension 9%, hot flashes <2%, " "constipation/diarrhea <2%, dry mouth <2%, and headache <4%.   Gemtesa (vibegron) starts working almost immediately - within a few days of first taking it, with noticeable improvements in urinary urgency, frequency, and incontinence noted in clinical trials at 2 weeks which were reported as significant by 12 weeks.    URIBEL  Using as needed x several years  Tries not to use if she doesn't have to because she's \"not a good medication taker\" only wants to take when necessary.   Doesn't feel it's helping much at current frequency of 2-3 times weekly.   Recommended more frequent/regular dosing (every 6 hours) on days that she is having symptoms    Patient plans to apply for [facility], pending 1040    lost his job in November, had to take out his 401k to use it for living.   I'll reach out to our patient benefits team to see what options are.   Patient confirms their income is within the below guidelines  Patient confirms they live in [place]  Patient confirms they have current insurance that covers medications  Patient confirms they are not part of the Medicare Prescription Payment Program (MPPP)    2025 Poverty Guidelines     Persons in family/household 400% federal Poverty Limit    1 62,600    2 84,600    3 106,600    4 128,600    5 150,600    6 172,600    7 194,600    8 216,600              START  Gemtesa 75 mg once daily    CONTINUE  Uribel prn   -Not covered by insurance, very costly at [facility]. Will send back to patients preferred pharmacy at next visit.     STOP  Oxybutynin XL        Follow-up: 5/29/25 1:40pm     Time spent with pt: Total length of time 30 (minutes) of the encounter and more than 50% was spent counseling the patient.      Sharyn Norris, Pharm.D, FAM, [facility], [facility]  Clinical Pharmacist  Pharmacy Services  [phone]    Continue all meds under the continuation of care with the referring provider and clinical pharmacy team.    Verbal consent to manage patient's drug therapy was obtained " from the patient. They were informed they may decline to participate or withdraw from participation in pharmacy services at any time.         [1]   Current Outpatient Medications on File Prior to Visit   Medication Sig Dispense Refill    aspirin 81 mg EC tablet Take 1 tablet (81 mg) by mouth once daily.      diazePAM (Valium) 10 mg tablet Prior to DENTAL APPTS.      melatonin 10 mg tablet,ext release multiphase Take 1 tablet by mouth once daily at bedtime.      methen-m.blue-s.phos-phsal-hyo (UribeL) 118-10-40.8-36 mg capsule Take 1 capsule by mouth every 6 hours if needed (dysuria).      metoprolol tartrate (Lopressor) 25 mg tablet Take 1 tablet (25 mg) by mouth 2 times a day. 180 tablet 3    oxybutynin XL (Ditropan XL) 10 mg 24 hr tablet Take 2 tablets (20 mg) by mouth once daily. Do not crush, chew, or split. 60 tablet 1    pantoprazole (ProtoNix) 40 mg EC tablet Take 1 tablet (40 mg) by mouth 2 times a day. Do not crush, chew, or split. 60 tablet 1    sertraline (Zoloft) 100 mg tablet Take 1 tablet (100 mg) by mouth once daily. 90 tablet 1    simvastatin (Zocor) 20 mg tablet Take 1 tablet (20 mg) by mouth once daily at bedtime. 90 tablet 3     No current facility-administered medications on file prior to visit.

## 2025-04-24 ENCOUNTER — APPOINTMENT (OUTPATIENT)
Dept: PHARMACY | Facility: HOSPITAL | Age: 60
End: 2025-04-24
Payer: COMMERCIAL

## 2025-04-24 DIAGNOSIS — N32.81 OAB (OVERACTIVE BLADDER): ICD-10-CM

## 2025-04-24 DIAGNOSIS — N30.10 INTERSTITIAL CYSTITIS: ICD-10-CM

## 2025-04-24 RX ORDER — VIBEGRON 75 MG/1
75 TABLET, FILM COATED ORAL DAILY
Qty: 90 TABLET | Refills: 3 | Status: SHIPPED | OUTPATIENT
Start: 2025-04-24

## 2025-04-24 RX ORDER — METHENAMINE, SODIUM PHOSPHATE, MONOBASIC, MONOHYDRATE, PHENYL SALICYLATE, METHYLENE BLUE, AND HYOSCYAMINE SULFATE 118; 40.8; 36; 10; .12 MG/1; MG/1; MG/1; MG/1; MG/1
1 CAPSULE ORAL EVERY 6 HOURS PRN
Qty: 360 CAPSULE | Refills: 3 | Status: SHIPPED | OUTPATIENT
Start: 2025-04-24

## 2025-05-01 PROCEDURE — RXMED WILLOW AMBULATORY MEDICATION CHARGE

## 2025-05-02 ENCOUNTER — PHARMACY VISIT (OUTPATIENT)
Dept: PHARMACY | Facility: CLINIC | Age: 60
End: 2025-05-02
Payer: COMMERCIAL

## 2025-05-13 ENCOUNTER — APPOINTMENT (OUTPATIENT)
Dept: PRIMARY CARE | Facility: CLINIC | Age: 60
End: 2025-05-13
Payer: COMMERCIAL

## 2025-05-13 ENCOUNTER — TELEPHONE (OUTPATIENT)
Dept: PHARMACY | Facility: HOSPITAL | Age: 60
End: 2025-05-13

## 2025-05-13 DIAGNOSIS — N32.81 OAB (OVERACTIVE BLADDER): ICD-10-CM

## 2025-05-13 DIAGNOSIS — N30.10 INTERSTITIAL CYSTITIS: ICD-10-CM

## 2025-05-13 RX ORDER — METHENAMINE, SODIUM PHOSPHATE, MONOBASIC, MONOHYDRATE, PHENYL SALICYLATE, METHYLENE BLUE, AND HYOSCYAMINE SULFATE 118; 40.8; 36; 10; .12 MG/1; MG/1; MG/1; MG/1; MG/1
1 CAPSULE ORAL EVERY 6 HOURS PRN
Qty: 360 CAPSULE | Refills: 3 | Status: SHIPPED | OUTPATIENT
Start: 2025-05-13

## 2025-05-13 NOTE — TELEPHONE ENCOUNTER
Patient has started Myrbetriq. She has stopped Gemtesa.     She is feeling dizzy with severe headaches since starting Myrbetriq.     She takes at 7pm the following morning she has dizziness/headache around 10-11am. Symptoms started 2 days after taking the medication.     She is monitoring her BP at home and at work. BP has been WNL.     Recommend holding myrbetriq x 7 days. If symptoms of headache/dizziness persist and/or worsen follow up with PCP and/or seek immediate medical attention for further assessment. If symptoms resolve, we will reconsider Gemtesa and try to appeal further.   -She will call me Tuesday to let me know how her dizziness/headache is doing and if they have resolved.     Patient has already tried/failed: oxybutynin - didn't work and memory concerns.      Patient Assistance  -Sent statement of life qualifying event via email         Sharyn Norris, Pharm.D.

## 2025-05-27 NOTE — PROGRESS NOTES
"  Patient ID: Yarelis Brantley \"Thu\" is a 60 y.o. female who presents for No chief complaint on file..    Pt is here for Follow Up appointment.     Referring Provider: Pilar Arredondo AP*  Reason for Referral: cost assistance    Subjective     Medication and allergy reconciliation completed     Drug Interactions  No relevant drug interactions were noted.    Medication System Management  Patient's preferred pharmacy:     [facility]  [address]  [place] [address]  Phone: [phone] Fax: [phone]    [facility]  [address], [address]  [place] [address]  Phone: [phone] Fax: [phone]    Adherence/Organization: no concerns  Affordability/Accessibility: Assess for [facility]      Medications Ordered Prior to Encounter[1]      Urinary Symptoms  Medications that may contribute to symptoms:   diuretics, anticholinergics, alpha blockers (doxazosin, prazosin, terazosin), tricyclic antidepressants, antipsychotics (lithium, clozapine), calcium channel blockers (causes bladder to relax and affects ability to empty properly), opioids (impair bladder contraction), antihistamines (diphenhydramine, chlorpheniramine), pseudoephedrine, phenylephrine, SGLT2 inhibitors (increases the amount of glucose and fluid excreted through kidneys).   Any history of:   Narrow-angle glaucoma: No  Impaired gastric emptying:No  Urinary retention: No  If yes to any of the above use caution/avoid antimuscarinic medications  Prediabetes or diabetes:  No  Lab Results   Component Value Date    GLUCOSE 119 (H) 06/26/2024    HGBA1C 5.4 07/01/2024    HGBA1C 5.4 01/16/2023     No results found for: \"LEPTIN\", \"INSULFAST\", \"GLUF\"  Frequently of bowel movement: 1x/day sometimes every other day  Tobacco use: No  How many protective undergarments are you utilizing daily: wears protective underwear daily, experiences daily incontinence. If she has the urge to go it comes out right away, feels " "very little control over her bladder.     What medications have been tried/stopped?   Oxybutynin- not working  Current medication? Oxybutynin  When started?   Side effects? no  Improvement in symptoms? Worsening of symptoms      Cardiovascular Health  The ASCVD Risk score (Lucio HUBER, et al., 2019) failed to calculate for the following reasons:    Risk score cannot be calculated because patient has a medical history suggesting prior/existing ASCVD    Lab Results   Component Value Date    CHOL 189 01/16/2023     Lab Results   Component Value Date    HDL 47.4 01/16/2023     No results found for: \"LDLCALC\"  Lab Results   Component Value Date    TRIG 99 01/16/2023     No components found for: \"CHOLHDL\"     Vitals  BP Readings from Last 2 Encounters:   04/01/25 112/78   10/30/24 110/76     BMI Readings from Last 1 Encounters:   04/01/25 31.06 kg/m²        BMP  Lab Results   Component Value Date    CALCIUM 9.0 06/26/2024     06/26/2024    K 4.3 06/26/2024    CO2 25 06/26/2024     06/26/2024    BUN 15 06/26/2024    CREATININE 0.86 06/26/2024    EGFR 77 06/26/2024     Make sure GFR >15 ml/min or dose adjust    LFTs  Lab Results   Component Value Date    ALT 16 03/09/2024    AST 21 03/09/2024    ALKPHOS 79 03/09/2024    BILITOT 0.7 03/09/2024         Assessment/Plan     Patient is experiencing urinary frequency, urgency, and daily incontinence. Bladder symptoms have worsened over the past 2-3 months. Having bladder spasms that feel like a \"heart beat\" and feels like she has an \"on going UTI.\"   Has tried before:   Oxybutynin: not working  Anticholinergic medications are not appropriate due to concern for cognitive impairment.   Patient has a pace maker - myrbetriq is not an appropriate option due to risk of blood pressure elevation however, insurance has denied appeal for this reason, so will try and patient will monitor BP closely at home.       Myrbetriq change to GEMTESA  When she stopped taking Myrbetriq her " "dizziness and headaches resolved, she then decided to restart oxybutynin  Has tried/failed:   oxybutynin - memory concerns  Myrbetriq - headache and severe dizziness  She is agreeable to switching back to Gemtesa.   Provided patient education and counseling.     URIBEL to Phennazopyridine  Patient has tried/failed: methenamine and hyoscyamine   To get uribel covered patient must first try/fail methenamine, hyoscyamine, and phenazopyridine   Discussed with Ananya, she is agreeable to patient trying phenazopyridine as needed for symptoms.   She previously was using Uribel as needed x several years until insurance coverage changed.   Tries not to use if she doesn't have to because she's \"not a good medication taker\" only wants to take when necessary.        Patient Assistance pending  Sent statement of life qualifying event via email   Will need copy of page 1 and 2 of patients form 1040 from 2024      START  Eladio Braswell working on PA    Phenazopyridine 200mg three times daily for up to 2-3 days as needed.   -replaces Uribel      STOP  Oxybutynin XL      Follow-up: 7/29/25 1:20pm     Time spent with pt: Total length of time 30 (minutes) of the encounter and more than 50% was spent counseling the patient.      Sharyn Norris, Pharm.D, FAM, IFP, [facility]  Clinical Pharmacist  Pharmacy Services  [phone]    Continue all meds under the continuation of care with the referring provider and clinical pharmacy team.    Verbal consent to manage patient's drug therapy was obtained from the patient. They were informed they may decline to participate or withdraw from participation in pharmacy services at any time.         [1]   Current Outpatient Medications on File Prior to Visit   Medication Sig Dispense Refill    aspirin 81 mg EC tablet Take 1 tablet (81 mg) by mouth once daily.      diazePAM (Valium) 10 mg tablet Prior to DENTAL APPTS.      melatonin 10 mg tablet,ext release multiphase Take 1 tablet by mouth once daily " at bedtime.      methen-m.blue-s.phos-phsal-hyo (UribeL) 118-10-40.8-36 mg capsule Take 1 capsule by mouth every 6 hours if needed (dysuria). 360 capsule 3    metoprolol tartrate (Lopressor) 25 mg tablet Take 1 tablet (25 mg) by mouth 2 times a day. 180 tablet 3    mirabegron (Myrbetriq) 25 mg tablet extended release 24 hr Take 1 tablet (25 mg) by mouth once daily. Monitor blood pressure at least twice weekly for the first two weeks after starting. 90 tablet 3    pantoprazole (ProtoNix) 40 mg EC tablet Take 1 tablet (40 mg) by mouth 2 times a day. Do not crush, chew, or split. 60 tablet 1    sertraline (Zoloft) 100 mg tablet Take 1 tablet (100 mg) by mouth once daily. 90 tablet 1    simvastatin (Zocor) 20 mg tablet Take 1 tablet (20 mg) by mouth once daily at bedtime. 90 tablet 3     No current facility-administered medications on file prior to visit.

## 2025-05-29 ENCOUNTER — APPOINTMENT (OUTPATIENT)
Dept: PHARMACY | Facility: HOSPITAL | Age: 60
End: 2025-05-29
Payer: COMMERCIAL

## 2025-05-29 DIAGNOSIS — N32.81 OAB (OVERACTIVE BLADDER): ICD-10-CM

## 2025-05-29 DIAGNOSIS — N30.10 INTERSTITIAL CYSTITIS: ICD-10-CM

## 2025-05-29 PROCEDURE — RXMED WILLOW AMBULATORY MEDICATION CHARGE

## 2025-05-29 RX ORDER — VIBEGRON 75 MG/1
75 TABLET, FILM COATED ORAL DAILY
Qty: 90 TABLET | Refills: 3 | Status: SHIPPED | OUTPATIENT
Start: 2025-05-29

## 2025-05-29 RX ORDER — PHENAZOPYRIDINE HYDROCHLORIDE 200 MG/1
200 TABLET, FILM COATED ORAL 3 TIMES DAILY PRN
Qty: 30 TABLET | Refills: 3 | Status: SHIPPED | OUTPATIENT
Start: 2025-05-29 | End: 2025-07-08

## 2025-05-29 NOTE — Clinical Note
Josesito Hare,  Per insurance to get uribel covered patient must first try/fail methenamine, hyoscyamine, and phenazopyridine. I talked with her and she said she hasn't tried any of these before, although she technically has since her current uribel has hyoscyamine and methenamine in it. She's just using it prn, but it's super expensive if not covered. So I'm not sure if you'd like her to try phenazopyridine so that we could say she has tried all three options then?   Warmly,   Sharyn

## 2025-05-30 ENCOUNTER — APPOINTMENT (OUTPATIENT)
Dept: UROLOGY | Facility: CLINIC | Age: 60
End: 2025-05-30
Payer: COMMERCIAL

## 2025-05-30 PROCEDURE — RXMED WILLOW AMBULATORY MEDICATION CHARGE

## 2025-06-02 ENCOUNTER — PHARMACY VISIT (OUTPATIENT)
Dept: PHARMACY | Facility: CLINIC | Age: 60
End: 2025-06-02
Payer: COMMERCIAL

## 2025-06-02 PROCEDURE — RXMED WILLOW AMBULATORY MEDICATION CHARGE

## 2025-06-25 DIAGNOSIS — R11.2 NAUSEA AND VOMITING, UNSPECIFIED VOMITING TYPE: ICD-10-CM

## 2025-06-25 PROCEDURE — RXMED WILLOW AMBULATORY MEDICATION CHARGE

## 2025-06-27 ENCOUNTER — PHARMACY VISIT (OUTPATIENT)
Dept: PHARMACY | Facility: CLINIC | Age: 60
End: 2025-06-27
Payer: COMMERCIAL

## 2025-06-27 PROCEDURE — RXMED WILLOW AMBULATORY MEDICATION CHARGE

## 2025-06-27 RX ORDER — PANTOPRAZOLE SODIUM 40 MG/1
40 TABLET, DELAYED RELEASE ORAL
Qty: 90 TABLET | Refills: 3 | Status: SHIPPED | OUTPATIENT
Start: 2025-06-27 | End: 2026-06-27

## 2025-06-30 ENCOUNTER — PHARMACY VISIT (OUTPATIENT)
Dept: PHARMACY | Facility: CLINIC | Age: 60
End: 2025-06-30
Payer: COMMERCIAL

## 2025-07-02 ENCOUNTER — PHARMACY VISIT (OUTPATIENT)
Dept: PHARMACY | Facility: CLINIC | Age: 60
End: 2025-07-02
Payer: COMMERCIAL

## 2025-07-08 ENCOUNTER — TELEPHONE (OUTPATIENT)
Dept: GASTROENTEROLOGY | Facility: CLINIC | Age: 60
End: 2025-07-08

## 2025-07-08 ENCOUNTER — APPOINTMENT (OUTPATIENT)
Dept: GASTROENTEROLOGY | Facility: CLINIC | Age: 60
End: 2025-07-08
Payer: COMMERCIAL

## 2025-07-08 VITALS
BODY MASS INDEX: 31.21 KG/M2 | WEIGHT: 218 LBS | SYSTOLIC BLOOD PRESSURE: 109 MMHG | OXYGEN SATURATION: 96 % | HEART RATE: 75 BPM | DIASTOLIC BLOOD PRESSURE: 75 MMHG | HEIGHT: 70 IN

## 2025-07-08 DIAGNOSIS — R11.0 NAUSEA: Primary | ICD-10-CM

## 2025-07-08 DIAGNOSIS — K21.9 GASTROESOPHAGEAL REFLUX DISEASE, UNSPECIFIED WHETHER ESOPHAGITIS PRESENT: ICD-10-CM

## 2025-07-08 PROCEDURE — 3008F BODY MASS INDEX DOCD: CPT | Performed by: INTERNAL MEDICINE

## 2025-07-08 PROCEDURE — 99214 OFFICE O/P EST MOD 30 MIN: CPT | Performed by: INTERNAL MEDICINE

## 2025-07-08 PROCEDURE — 1036F TOBACCO NON-USER: CPT | Performed by: INTERNAL MEDICINE

## 2025-07-08 RX ORDER — DEXLANSOPRAZOLE 60 MG/1
60 CAPSULE, DELAYED RELEASE ORAL DAILY
Qty: 90 EACH | Refills: 1 | Status: SHIPPED | OUTPATIENT
Start: 2025-07-08 | End: 2025-07-09

## 2025-07-08 NOTE — PATIENT INSTRUCTIONS
Please call [phone] to schedule gastric emptying study.  Start Dexilant if insurance will cover it.  Return to clinic in 2 months.

## 2025-07-08 NOTE — ASSESSMENT & PLAN NOTE
Please call [phone] to schedule gastric emptying study.  Start Dexilant if insurance will cover it.  Return to clinic in 2 months.

## 2025-07-08 NOTE — PROGRESS NOTES
"Gastroenterology Office Visit     History of Present Illness:   Yarelis Brantley \"Wellington" is a 60 y.o. female who presents to GI clinic for follow up of GERD and dysphagia.  Has been seeing someone in our GI clinic since 12/20, GERD for 10+ yrs, worse over last 6 months.  Now associated with daily nausea.  Has woke up with severe chest pain in the middle of the night 2x in last 3 months.  Did not eat late on those occasions-does not eat past 530 pm.  Still has dysphagia- predates her EGD in 2021.     Has been on Nexium, omeprazole and now pantoprazole bid-none of these have helped.     Since last OV in 6/24 (saw Mavis Mosqueda), patient has had EGD; see below for results.  Increased pantoprazole to 40 bid after last OV- taking optimally.    S/p pacemaker, Watchman for Afib.     Review of Systems  Constitutional: denies fever/ chills, night sweats, wt loss and fatigue  Respiratory: denies SOB, KEITA  CV: denies chest pain and LE edema  Neuro: denies weakness and difficulty walking      Past Medical History   has a past medical history of Anxiety, COVID-19, Diarrhea, Dorsalgia, unspecified, Encounter for follow-up examination after completed treatment for conditions other than malignant neoplasm (12/14/2021), Encounter for other administrative examinations (12/14/2021), Epigastric abdominal tenderness (12/14/2021), Fever, unspecified (12/10/2021), GERD (gastroesophageal reflux disease), Hyperlipidemia, Numbness, Other conditions influencing health status (12/10/2021), Other disturbances of smell and taste (12/10/2021), Personal history of other diseases of the circulatory system (03/21/2017), Personal history of other diseases of the circulatory system (03/21/2017), Personal history of other diseases of the circulatory system, Personal history of other diseases of the digestive system (03/21/2017), Personal history of other diseases of the digestive system (01/19/2022), Personal history of other diseases of the digestive " system, Personal history of other diseases of the digestive system, Personal history of other diseases of the musculoskeletal system and connective tissue (12/20/2017), Personal history of other endocrine, nutritional and metabolic disease (03/21/2017), Personal history of other medical treatment, Personal history of other mental and behavioral disorders (03/21/2017), Personal history of other mental and behavioral disorders, Personal history of other specified conditions (01/20/2022), Personal history of other specified conditions (01/20/2022), Personal history of other specified conditions (01/20/2022), Personal history of other specified conditions (07/15/2021), Personal history of other specified conditions (12/14/2021), Personal history of other specified conditions, Personal history of other specified conditions, Personal history of other specified conditions, Personal history of other specified conditions (12/04/2019), Personal history of urinary (tract) infections (12/26/2019), Restless leg syndrome, RLS (restless legs syndrome), Shingles, Shortness of breath, Stroke (Multi), Syncope, TIA (transient ischemic attack), Urge incontinence (11/13/2020), and Vision loss.    She has no past medical history of Personal history of irradiation.     Problem List  Problem List[1]    Past Surgical History  Surgical History[2]    Social History   reports that she has quit smoking. Her smoking use included cigarettes. She started smoking about 47 years ago. She has a 32.5 pack-year smoking history. She has been exposed to tobacco smoke. She has never used smokeless tobacco. She reports that she does not currently use alcohol. She reports that she does not use drugs.     Family History  family history includes Abnormal EKG in her father and mother; Alzheimer's disease in her mother's sister; Anxiety disorder in her father; Atrial fibrillation in her father and mother; Cancer in her mother; Coronary artery disease in her  "father; Depression in her father, maternal grandmother, mother, mother's sister, and paternal grandmother; Diabetes type I in her paternal grandmother and paternal grandmother; Heart attack in her father; Heart disease in her father, mother, and paternal grandmother; Heart failure in her father; Leukemia in her mother; Migraines in her mother; Multiple sclerosis in her mother's sister; Stroke in her mother.       Allergies  RX Allergies[3]    Medications  Current Outpatient Medications   Medication Instructions    aspirin 81 mg, Daily    dexlansoprazole (DEXILANT) 60 mg, oral, Daily, Do not crush or chew.    diazePAM (Valium) 10 mg tablet Prior to DENTAL APPTS.    Gemtesa 75 mg, oral, Daily    melatonin 10 mg tablet,ext release multiphase 1 tablet, Nightly    metoprolol tartrate (LOPRESSOR) 25 mg, oral, 2 times daily    pantoprazole (PROTONIX) 40 mg, oral, Daily before breakfast, Do not crush, chew, or split.    phenazopyridine (PYRIDIUM) 200 mg, oral, 3 times daily PRN    sertraline (ZOLOFT) 100 mg, oral, Daily    simvastatin (ZOCOR) 20 mg, oral, Nightly        Objective   Blood pressure 109/75, pulse 75, height 1.778 m (5' 10\"), weight 98.9 kg (218 lb), SpO2 96%.        LABS  Component      Latest Ref Rng 3/9/2024   WBC      4.4 - 11.3 x10*3/uL 7.8    nRBC      0.0 - 0.0 /100 WBCs 0.0    RBC      4.00 - 5.20 x10*6/uL 4.56    HEMOGLOBIN      12.0 - 16.0 g/dL 14.0    HEMATOCRIT      36.0 - 46.0 % 42.0    MCV      80 - 100 fL 92    MCH      26.0 - 34.0 pg 30.7    MCHC      32.0 - 36.0 g/dL 33.3    RED CELL DISTRIBUTION WIDTH      11.5 - 14.5 % 12.7    Platelets      150 - 450 x10*3/uL 223        Component      Latest Ref Rng 3/9/2024 6/4/2024   Albumin      3.4 - 5.0 g/dL 4.2     Alkaline Phosphatase      33 - 136 U/L 79     Total Protein      6.4 - 8.2 g/dL 7.2     AST      9 - 39 U/L 21     Bilirubin Total      0.0 - 1.2 mg/dL 0.7     ALT      7 - 45 U/L 16     Tissue Transglutaminase, IgA      <15.0 U/mL  <1.0  " "  Tissue Transglutamase IgG      0.00 - 4.99 FLU  <0.82    Deamidated Gliadin Antibody IgA      <15.0 U/mL  2.5    Deamidated Gliadin Antibody IgG      0.00 - 4.99 FLU  <0.56            Radiology  [facility] 2021: no aspiration    Endoscopy    Colonoscopy 3/17: normal except hems, repeat in 10 yrs    EGD 8/24: small HH, class A esophagitis    EGD 1/21: Schatzki's ring dilated to 51Fr; o/w normal esophagus; bx negative EOE  EGD 2016: bx negative EOE    Assessment/Plan   Yarelis Brantley \"Thu\" is a 60 y.o. female who presents to GI clinic for GERD and dysphagia; now with daily nausea.  EOE has been excluded.      Gastroesophageal reflux disease  Please call [phone] to schedule gastric emptying study.  Start Dexilant if insurance will cover it.  Return to clinic in 2 months.        Consider esophageal manometry +/- Bravo next time if no better.     Naldo Saldaña MD     Addendum:  Pharmacist notified me that Dexilant PA was denied, try Aciphex.  Medication e-scripted to pharmacy.    Naldo Saldaña MD           [1]   Patient Active Problem List  Diagnosis    Atrial fibrillation (Multi)    Anxiety    Autonomic dysfunction    Cardiac pacemaker in situ    Mixed dyslipidemia    RLS (restless legs syndrome)    TIA (transient ischemic attack)    Presence of Amulet left atrial appendage closure device    Encounter for completion of form with patient    Nausea and vomiting    IFG (impaired fasting glucose)    Diarrhea    Sinus node dysfunction (Multi)    Former smoker    BMI 29.0-29.9,adult    Encounter for medication review and counseling    Encounter to discuss treatment options    Presence of Watchman left atrial appendage closure device    Gastroesophageal reflux disease    Encounter for medication refill    Chest pain   [2]   Past Surgical History:  Procedure Laterality Date    APPENDECTOMY  03/21/2017    Appendectomy    BREAST BIOPSY Right 2005    3/10/2006    CARDIAC CATHETERIZATION N/A 03/01/2024    Procedure: LAAO (Left " Atrial Appendage Occlusion);  Surgeon: Avtar Rosas MD;  Location: [facility] Cardiac Cath Lab;  Service: Cardiovascular;  Laterality: N/A;  SD CT at 0815/[employer]    CARDIAC CATHETERIZATION N/A 2024    Procedure: LAAO (Left Atrial Appendage Occlusion);  Surgeon: Avtar Rosas MD;  [place] [facility];  Service: Cardiovascular;  Laterality: N/A;  CT Completed 3/1    CARDIAC PACEMAKER PLACEMENT  2017    Pacemaker Placement     SECTION, CLASSIC  2017     Section     SECTION, LOW TRANSVERSE      CHOLECYSTECTOMY  2017    Cholecystectomy    COLONOSCOPY  2017    Complete Colonoscopy    CT ANGIO NECK  01/15/2023    CT NECK ANGIO W AND WO IV CONTRAST 1/15/2023 DOCTOR OFFICE [facility]    CT HEAD ANGIO W AND WO IV CONTRAST  01/15/2023    CT HEAD ANGIO W AND WO IV CONTRAST 1/15/2023 DOCTOR OFFICE [facility]GAC[facility]    HYSTERECTOMY  1996    Hysterectomy    MR HEAD ANGIO WO IV CONTRAST  2023    MR HEAD ANGIO WO IV CONTRAST 2023 DOCTOR OFFICE [facility]    MR NECK ANGIO WO IV CONTRAST  2023    MR NECK ANGIO WO IV CONTRAST 2023 DOCTOR OFFICE [facility]    OTHER SURGICAL HISTORY  10/09/2021    Catheter ablation   [3]   Allergies  Allergen Reactions    Prochlorperazine Rash and Nausea/vomiting     vomiting

## 2025-07-09 ENCOUNTER — TELEPHONE (OUTPATIENT)
Dept: GASTROENTEROLOGY | Facility: CLINIC | Age: 60
End: 2025-07-09
Payer: COMMERCIAL

## 2025-07-09 PROCEDURE — RXMED WILLOW AMBULATORY MEDICATION CHARGE

## 2025-07-09 RX ORDER — RABEPRAZOLE SODIUM 20 MG/1
20 TABLET, DELAYED RELEASE ORAL DAILY
Qty: 30 TABLET | Refills: 5 | Status: SHIPPED | OUTPATIENT
Start: 2025-07-09

## 2025-07-09 NOTE — TELEPHONE ENCOUNTER
I left another message to call [phone] to schedule a 2 month FUV with Dr. JOLENE Saldaña

## 2025-07-11 ENCOUNTER — TELEPHONE (OUTPATIENT)
Dept: GASTROENTEROLOGY | Facility: CLINIC | Age: 60
End: 2025-07-11
Payer: COMMERCIAL

## 2025-07-11 NOTE — TELEPHONE ENCOUNTER
I left another additional message to call [phone] to schedule a 2 month FUV with Dr. JOLENE Saldaña

## 2025-07-14 ENCOUNTER — PHARMACY VISIT (OUTPATIENT)
Dept: PHARMACY | Facility: CLINIC | Age: 60
End: 2025-07-14
Payer: COMMERCIAL

## 2025-07-14 ENCOUNTER — HOSPITAL ENCOUNTER (OUTPATIENT)
Dept: CARDIOLOGY | Facility: HOSPITAL | Age: 60
Discharge: HOME | End: 2025-07-14
Payer: COMMERCIAL

## 2025-07-14 DIAGNOSIS — I49.5 SICK SINUS SYNDROME (MULTI): ICD-10-CM

## 2025-07-14 DIAGNOSIS — Z95.0 PACEMAKER: ICD-10-CM

## 2025-07-14 PROCEDURE — 93294 REM INTERROG EVL PM/LDLS PM: CPT | Performed by: INTERNAL MEDICINE

## 2025-07-14 PROCEDURE — RXMED WILLOW AMBULATORY MEDICATION CHARGE

## 2025-07-14 PROCEDURE — 93296 REM INTERROG EVL PM/IDS: CPT

## 2025-07-28 ENCOUNTER — APPOINTMENT (OUTPATIENT)
Dept: PRIMARY CARE | Facility: CLINIC | Age: 60
End: 2025-07-28
Payer: COMMERCIAL

## 2025-07-29 ENCOUNTER — APPOINTMENT (OUTPATIENT)
Dept: PHARMACY | Facility: HOSPITAL | Age: 60
End: 2025-07-29
Payer: COMMERCIAL

## 2025-08-06 PROCEDURE — RXMED WILLOW AMBULATORY MEDICATION CHARGE

## 2025-08-11 ENCOUNTER — PHARMACY VISIT (OUTPATIENT)
Dept: PHARMACY | Facility: CLINIC | Age: 60
End: 2025-08-11
Payer: COMMERCIAL

## 2025-08-18 ENCOUNTER — APPOINTMENT (OUTPATIENT)
Dept: PRIMARY CARE | Facility: CLINIC | Age: 60
End: 2025-08-18
Payer: COMMERCIAL

## 2025-08-18 DIAGNOSIS — F41.9 ANXIETY: ICD-10-CM

## 2025-08-18 DIAGNOSIS — Z76.89 ENCOUNTER FOR WEIGHT MANAGEMENT: ICD-10-CM

## 2025-08-18 DIAGNOSIS — K21.9 GASTROESOPHAGEAL REFLUX DISEASE WITHOUT ESOPHAGITIS: ICD-10-CM

## 2025-08-18 DIAGNOSIS — R73.01 IFG (IMPAIRED FASTING GLUCOSE): Primary | ICD-10-CM

## 2025-08-18 DIAGNOSIS — R53.83 FATIGUE, UNSPECIFIED TYPE: ICD-10-CM

## 2025-08-18 DIAGNOSIS — E78.2 MIXED DYSLIPIDEMIA: ICD-10-CM

## 2025-08-18 DIAGNOSIS — Z71.89 ENCOUNTER TO DISCUSS TREATMENT OPTIONS: ICD-10-CM

## 2025-08-18 PROCEDURE — 99214 OFFICE O/P EST MOD 30 MIN: CPT | Performed by: FAMILY MEDICINE

## 2025-08-18 ASSESSMENT — PATIENT HEALTH QUESTIONNAIRE - PHQ9
2. FEELING DOWN, DEPRESSED OR HOPELESS: NOT AT ALL
SUM OF ALL RESPONSES TO PHQ9 QUESTIONS 1 AND 2: 0
1. LITTLE INTEREST OR PLEASURE IN DOING THINGS: NOT AT ALL

## 2025-08-19 ENCOUNTER — PATIENT MESSAGE (OUTPATIENT)
Dept: PRIMARY CARE | Facility: CLINIC | Age: 60
End: 2025-08-19
Payer: COMMERCIAL

## 2025-08-19 DIAGNOSIS — R73.01 IFG (IMPAIRED FASTING GLUCOSE): ICD-10-CM

## 2025-08-19 DIAGNOSIS — E66.9 OBESITY (BMI 30-39.9): ICD-10-CM

## 2025-08-19 DIAGNOSIS — Z76.89 ENCOUNTER FOR WEIGHT MANAGEMENT: Primary | ICD-10-CM

## 2025-08-20 LAB
25(OH)D3+25(OH)D2 SERPL-MCNC: 23 NG/ML (ref 30–100)
ALBUMIN SERPL-MCNC: 4.2 G/DL (ref 3.6–5.1)
ALP SERPL-CCNC: 80 U/L (ref 37–153)
ALT SERPL-CCNC: 15 U/L (ref 6–29)
ANION GAP SERPL CALCULATED.4IONS-SCNC: 8 MMOL/L (CALC) (ref 7–17)
AST SERPL-CCNC: 17 U/L (ref 10–35)
BASOPHILS # BLD AUTO: 38 CELLS/UL (ref 0–200)
BASOPHILS NFR BLD AUTO: 0.8 %
BILIRUB SERPL-MCNC: 0.5 MG/DL (ref 0.2–1.2)
BUN SERPL-MCNC: 18 MG/DL (ref 7–25)
CALCIUM SERPL-MCNC: 9.3 MG/DL (ref 8.6–10.4)
CHLORIDE SERPL-SCNC: 106 MMOL/L (ref 98–110)
CHOLEST SERPL-MCNC: 176 MG/DL
CHOLEST/HDLC SERPL: 2.8 (CALC)
CO2 SERPL-SCNC: 26 MMOL/L (ref 20–32)
CREAT SERPL-MCNC: 0.86 MG/DL (ref 0.5–1.05)
EGFRCR SERPLBLD CKD-EPI 2021: 76 ML/MIN/1.73M2
EOSINOPHIL # BLD AUTO: 240 CELLS/UL (ref 15–500)
EOSINOPHIL NFR BLD AUTO: 5.1 %
ERYTHROCYTE [DISTWIDTH] IN BLOOD BY AUTOMATED COUNT: 12.1 % (ref 11–15)
EST. AVERAGE GLUCOSE BLD GHB EST-MCNC: 111 MG/DL
EST. AVERAGE GLUCOSE BLD GHB EST-SCNC: 6.2 MMOL/L
GLUCOSE SERPL-MCNC: 88 MG/DL (ref 65–99)
HBA1C MFR BLD: 5.5 %
HCT VFR BLD AUTO: 43 % (ref 35–45)
HDLC SERPL-MCNC: 64 MG/DL
HGB BLD-MCNC: 13.8 G/DL (ref 11.7–15.5)
LDLC SERPL CALC-MCNC: 93 MG/DL (CALC)
LYMPHOCYTES # BLD AUTO: 1133 CELLS/UL (ref 850–3900)
LYMPHOCYTES NFR BLD AUTO: 24.1 %
MCH RBC QN AUTO: 29.7 PG (ref 27–33)
MCHC RBC AUTO-ENTMCNC: 32.1 G/DL (ref 32–36)
MCV RBC AUTO: 92.7 FL (ref 80–100)
MONOCYTES # BLD AUTO: 287 CELLS/UL (ref 200–950)
MONOCYTES NFR BLD AUTO: 6.1 %
NEUTROPHILS # BLD AUTO: 3003 CELLS/UL (ref 1500–7800)
NEUTROPHILS NFR BLD AUTO: 63.9 %
NONHDLC SERPL-MCNC: 112 MG/DL (CALC)
PLATELET # BLD AUTO: 244 THOUSAND/UL (ref 140–400)
PMV BLD REES-ECKER: 11.1 FL (ref 7.5–12.5)
POTASSIUM SERPL-SCNC: 4.8 MMOL/L (ref 3.5–5.3)
PROT SERPL-MCNC: 6.6 G/DL (ref 6.1–8.1)
RBC # BLD AUTO: 4.64 MILLION/UL (ref 3.8–5.1)
SODIUM SERPL-SCNC: 140 MMOL/L (ref 135–146)
TRIGL SERPL-MCNC: 95 MG/DL
TSH SERPL-ACNC: 2 MIU/L (ref 0.4–4.5)
WBC # BLD AUTO: 4.7 THOUSAND/UL (ref 3.8–10.8)

## 2025-08-29 ENCOUNTER — HOSPITAL ENCOUNTER (OUTPATIENT)
Dept: CARDIOLOGY | Facility: HOSPITAL | Age: 60
Discharge: HOME | End: 2025-08-29
Payer: COMMERCIAL

## 2025-08-29 DIAGNOSIS — Z95.0 PACEMAKER: ICD-10-CM

## 2025-08-29 DIAGNOSIS — I49.5 SICK SINUS SYNDROME (MULTI): ICD-10-CM

## 2025-08-29 PROCEDURE — 93296 REM INTERROG EVL PM/IDS: CPT

## 2025-09-22 ENCOUNTER — APPOINTMENT (OUTPATIENT)
Dept: PRIMARY CARE | Facility: CLINIC | Age: 60
End: 2025-09-22
Payer: COMMERCIAL

## 2025-09-24 ENCOUNTER — APPOINTMENT (OUTPATIENT)
Dept: PRIMARY CARE | Facility: CLINIC | Age: 60
End: 2025-09-24
Payer: COMMERCIAL

## 2025-09-30 ENCOUNTER — APPOINTMENT (OUTPATIENT)
Dept: CARDIOLOGY | Facility: CLINIC | Age: 60
End: 2025-09-30
Payer: COMMERCIAL

## 2025-10-31 ENCOUNTER — APPOINTMENT (OUTPATIENT)
Dept: INTEGRATIVE MEDICINE | Facility: CLINIC | Age: 60
End: 2025-10-31
Payer: COMMERCIAL

## (undated) DEVICE — VERSACROSS KIT, ACCESS SOLUTION, RF WIRE 230CM

## (undated) DEVICE — ACCESS KIT, S-MAK MINI, 4FR 10CM 0.018IN 40CM, NT/PT, ECHO ENHANCE NEEDLE

## (undated) DEVICE — KIT, GUIDEWIRE, .009 - .018 IN W COPILOT BLEEDBACK VALVE

## (undated) DEVICE — CATHETER, ACUSON ACUNAV ULTRASOUND, 8FR 90CM

## (undated) DEVICE — SHEATH, PINNACLE, 10 CM,  8FR INTRODUCER, 8FR DIA, 2.5 CM DIALATOR

## (undated) DEVICE — VERSACROSS KIT, ACCESS SOLUTION, RF WIRE 180CM

## (undated) DEVICE — SHEATH, PINNACLE, 10 CM,  7FR INTRODUCER, 7FR DIA, 2.5 CM DIALATOR

## (undated) DEVICE — SYSTEM, ACCESS, FXD DBL CURVE, WATCHMAN

## (undated) DEVICE — SHEATH, PINNACLE, W/O GUIDEWIRE, 8FR INTRODUCER,  8FR DIA, 2.5 CM DILATOR

## (undated) DEVICE — CLOSURE SYSTEM, VASCULAR, MVP 6-12FR, VENOUS

## (undated) DEVICE — CATHETER, ANGIO, IMPULSE, PIG 155, 6 FR X 110 CM

## (undated) DEVICE — GUIDEWIRE, INQWIRE, 3MM J, .035, 150

## (undated) DEVICE — ELECTRODE, QUICK-COMBO, EDGE SYSTEM, REDI PACK

## (undated) DEVICE — CATHETER, ACUSON ACUNAV ULTRASOUND, 8FR 90CM  (REPROCESSED)

## (undated) DEVICE — DEVICE, CLOSURE, PERCLOSE, PROSTYLE